# Patient Record
Sex: FEMALE | Race: BLACK OR AFRICAN AMERICAN | NOT HISPANIC OR LATINO | URBAN - METROPOLITAN AREA
[De-identification: names, ages, dates, MRNs, and addresses within clinical notes are randomized per-mention and may not be internally consistent; named-entity substitution may affect disease eponyms.]

---
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
## 2022-11-09 ENCOUNTER — HOSPITAL ENCOUNTER (EMERGENCY)
Facility: HOSPITAL | Age: 22
Discharge: HOME | End: 2022-11-09
Attending: FAMILY MEDICINE
Payer: MEDICAID

## 2022-11-09 VITALS
BODY MASS INDEX: 43.4 KG/M2 | RESPIRATION RATE: 18 BRPM | DIASTOLIC BLOOD PRESSURE: 81 MMHG | HEIGHT: 69 IN | OXYGEN SATURATION: 98 % | WEIGHT: 293 LBS | HEART RATE: 90 BPM | TEMPERATURE: 98 F | SYSTOLIC BLOOD PRESSURE: 138 MMHG

## 2022-11-09 DIAGNOSIS — R68.89 FLU-LIKE SYMPTOMS: Primary | ICD-10-CM

## 2022-11-09 LAB
FLUAV AG UPPER RESP QL IA.RAPID: DETECTED
FLUBV AG UPPER RESP QL IA.RAPID: NOT DETECTED
SARS-COV-2 RNA RESP QL NAA+PROBE: NOT DETECTED
STREP A PCR (OHS): NOT DETECTED

## 2022-11-09 PROCEDURE — 99284 EMERGENCY DEPT VISIT MOD MDM: CPT | Mod: 25

## 2022-11-09 PROCEDURE — 0241U COVID/FLU A&B PCR: CPT | Performed by: FAMILY MEDICINE

## 2022-11-09 PROCEDURE — 87651 STREP A DNA AMP PROBE: CPT | Performed by: FAMILY MEDICINE

## 2022-11-09 RX ORDER — IBUPROFEN 800 MG/1
800 TABLET ORAL EVERY 6 HOURS PRN
Qty: 20 TABLET | Refills: 0 | Status: SHIPPED | OUTPATIENT
Start: 2022-11-09

## 2022-11-09 RX ORDER — OSELTAMIVIR PHOSPHATE 75 MG/1
75 CAPSULE ORAL 2 TIMES DAILY
Qty: 10 CAPSULE | Refills: 0 | Status: SHIPPED | OUTPATIENT
Start: 2022-11-09 | End: 2022-11-14

## 2022-11-09 RX ORDER — BENZONATATE 200 MG/1
200 CAPSULE ORAL 3 TIMES DAILY PRN
Qty: 15 CAPSULE | Refills: 0 | Status: SHIPPED | OUTPATIENT
Start: 2022-11-09 | End: 2022-11-19

## 2022-11-09 NOTE — ED PROVIDER NOTES
Encounter Date: 11/9/2022       History     Chief Complaint   Patient presents with    Sore Throat     Sore throat, body aches, and cough x2 days; also reports fever at home      22-year-old presents with flu-like symptoms body aches sore throat headache since last night had fever earlier none now chills have resolved abdominal pain no nausea vomiting diarrhea      Review of patient's allergies indicates:  No Known Allergies  History reviewed. No pertinent past medical history.  History reviewed. No pertinent surgical history.  History reviewed. No pertinent family history.     Review of Systems   Constitutional:  Positive for appetite change and fatigue. Negative for fever.   HENT:  Negative for sore throat.    Respiratory:  Negative for shortness of breath.    Cardiovascular:  Negative for chest pain.   Gastrointestinal:  Negative for nausea.   Genitourinary:  Negative for dysuria.   Musculoskeletal:  Negative for back pain.   Skin:  Negative for rash.   Neurological:  Negative for weakness.   Hematological:  Does not bruise/bleed easily.   All other systems reviewed and are negative.    Physical Exam     Initial Vitals [11/09/22 1500]   BP Pulse Resp Temp SpO2   138/81 90 18 98 °F (36.7 °C) 98 %      MAP       --         Physical Exam    Nursing note and vitals reviewed.  Constitutional: She appears well-developed and well-nourished. She is active.   HENT:   Head: Normocephalic and atraumatic.   Eyes: Conjunctivae, EOM and lids are normal. Pupils are equal, round, and reactive to light.   Neck: Trachea normal and phonation normal. Neck supple. No thyroid mass present.   Normal range of motion.  Cardiovascular:  Normal rate, regular rhythm, normal heart sounds and normal pulses.           Pulmonary/Chest: Breath sounds normal.   Abdominal: Abdomen is soft.   Musculoskeletal:         General: Normal range of motion.      Cervical back: Normal range of motion and neck supple.     Neurological: She is alert and  oriented to person, place, and time.   Skin: Skin is warm and intact.   Psychiatric: She has a normal mood and affect. Her speech is normal and behavior is normal. Judgment and thought content normal. Cognition and memory are normal.       ED Course   Procedures  Labs Reviewed   COVID/FLU A&B PCR - Abnormal; Notable for the following components:       Result Value    Influenza A PCR Detected (*)     All other components within normal limits    Narrative:     The Xpert Xpress SARS-CoV-2/FLU/RSV plus is a rapid, multiplexed real-time PCR test intended for the simultaneous qualitative detection and differentiation of SARS-CoV-2, Influenza A, Influenza B, and respiratory syncytial virus (RSV) viral RNA in either nasopharyngeal swab or nasal swab specimens.         STREP GROUP A BY PCR - Normal    Narrative:     The Xpert Xpress Strep A test is a rapid, qualitative in vitro diagnostic test for the detection of Streptococcus pyogenes (Group A ß-hemolytic Streptococcus, Strep A) in throat swab specimens from patients with signs and symptoms of pharyngitis.            Imaging Results    None          Medications - No data to display              ED Course as of 11/09/22 1615   Wed Nov 09, 2022   1611 STREP A PCR ([facility]): Not Detected [BL]      ED Course User Index  [BL] Austin Parish MD                 Clinical Impression:   Final diagnoses:  [R68.89] Flu-like symptoms (Primary)      ED Disposition Condition    Discharge Stable          ED Prescriptions       Medication Sig Dispense Start Date End Date Auth. Provider    oseltamivir (TAMIFLU) 75 MG capsule Take 1 capsule (75 mg total) by mouth 2 (two) times daily. for 5 days 10 capsule 11/9/2022 11/14/2022 Austin Parish MD    ibuprofen (ADVIL,MOTRIN) 800 MG tablet Take 1 tablet (800 mg total) by mouth every 6 (six) hours as needed for Pain. 20 tablet 11/9/2022 -- Austin Parish MD    benzonatate (TESSALON) 200 MG capsule Take 1 capsule (200 mg total) by mouth  3 (three) times daily as needed for Cough. 15 capsule 11/9/2022 11/19/2022 Austin Parish MD          Follow-up Information       Follow up With Specialties Details Why Contact Info    [facility] - Emergency Dept Emergency Medicine  If symptoms worsen [address] [id number] [phone]             Austin Parish MD  11/09/22 2670

## 2023-07-25 ENCOUNTER — HOSPITAL ENCOUNTER (EMERGENCY)
Facility: HOSPITAL | Age: 23
Discharge: HOME | End: 2023-07-25
Attending: SPECIALIST
Payer: MEDICAID

## 2023-07-25 VITALS
SYSTOLIC BLOOD PRESSURE: 105 MMHG | HEIGHT: 69 IN | TEMPERATURE: 98 F | RESPIRATION RATE: 18 BRPM | WEIGHT: 293 LBS | DIASTOLIC BLOOD PRESSURE: 72 MMHG | HEART RATE: 82 BPM | BODY MASS INDEX: 43.4 KG/M2

## 2023-07-25 DIAGNOSIS — R10.31 BILATERAL LOWER ABDOMINAL DISCOMFORT: ICD-10-CM

## 2023-07-25 DIAGNOSIS — M54.50 LOW BACK PAIN WITHOUT SCIATICA, UNSPECIFIED BACK PAIN LATERALITY, UNSPECIFIED CHRONICITY: Primary | ICD-10-CM

## 2023-07-25 DIAGNOSIS — R10.32 BILATERAL LOWER ABDOMINAL DISCOMFORT: ICD-10-CM

## 2023-07-25 LAB
ALBUMIN SERPL-MCNC: 4.1 G/DL (ref 3.5–5)
ALBUMIN/GLOB SERPL: 1.1 RATIO (ref 1.1–2)
ALP SERPL-CCNC: 90 UNIT/L (ref 40–150)
ALT SERPL-CCNC: 21 UNIT/L (ref 0–55)
APPEARANCE UR: CLEAR
AST SERPL-CCNC: 21 UNIT/L (ref 5–34)
B-HCG SERPL QL: NEGATIVE
BACTERIA #/AREA URNS AUTO: NORMAL /HPF
BASOPHILS # BLD AUTO: 0.01 X10(3)/MCL
BASOPHILS NFR BLD AUTO: 0.1 %
BILIRUB UR QL STRIP.AUTO: NEGATIVE
BILIRUBIN DIRECT+TOT PNL SERPL-MCNC: 0.6 MG/DL
BUN SERPL-MCNC: 15.8 MG/DL (ref 7–18.7)
CALCIUM SERPL-MCNC: 9.5 MG/DL (ref 8.4–10.2)
CHLORIDE SERPL-SCNC: 104 MMOL/L (ref 98–107)
CO2 SERPL-SCNC: 28 MMOL/L (ref 22–29)
COLOR UR: YELLOW
CREAT SERPL-MCNC: 0.83 MG/DL (ref 0.55–1.02)
EOSINOPHIL # BLD AUTO: 0.13 X10(3)/MCL (ref 0–0.9)
EOSINOPHIL NFR BLD AUTO: 1.6 %
ERYTHROCYTE [DISTWIDTH] IN BLOOD BY AUTOMATED COUNT: 15.3 % (ref 11.5–17)
GFR SERPLBLD CREATININE-BSD FMLA CKD-EPI: >60 MLS/MIN/1.73/M2
GLOBULIN SER-MCNC: 3.9 GM/DL (ref 2.4–3.5)
GLUCOSE SERPL-MCNC: 99 MG/DL (ref 74–100)
GLUCOSE UR QL STRIP.AUTO: NEGATIVE
HCT VFR BLD AUTO: 40.2 % (ref 37–47)
HGB BLD-MCNC: 12 G/DL (ref 12–16)
IMM GRANULOCYTES # BLD AUTO: 0.01 X10(3)/MCL (ref 0–0.04)
IMM GRANULOCYTES NFR BLD AUTO: 0.1 %
KETONES UR QL STRIP.AUTO: ABNORMAL
LEUKOCYTE ESTERASE UR QL STRIP.AUTO: NEGATIVE
LIPASE SERPL-CCNC: 46 U/L
LYMPHOCYTES # BLD AUTO: 1.8 X10(3)/MCL (ref 0.6–4.6)
LYMPHOCYTES NFR BLD AUTO: 22.4 %
MCH RBC QN AUTO: 21.5 PG (ref 27–31)
MCHC RBC AUTO-ENTMCNC: 29.9 G/DL (ref 33–36)
MCV RBC AUTO: 72.2 FL (ref 80–94)
MONOCYTES # BLD AUTO: 0.57 X10(3)/MCL (ref 0.1–1.3)
MONOCYTES NFR BLD AUTO: 7.1 %
NEUTROPHILS # BLD AUTO: 5.51 X10(3)/MCL (ref 2.1–9.2)
NEUTROPHILS NFR BLD AUTO: 68.7 %
NITRITE UR QL STRIP.AUTO: NEGATIVE
PH UR STRIP.AUTO: 5.5 [PH]
PLATELET # BLD AUTO: 320 X10(3)/MCL (ref 130–400)
PMV BLD AUTO: 9.7 FL (ref 7.4–10.4)
POTASSIUM SERPL-SCNC: 3.5 MMOL/L (ref 3.5–5.1)
PROT SERPL-MCNC: 8 GM/DL (ref 6.4–8.3)
PROT UR QL STRIP.AUTO: 30
RBC # BLD AUTO: 5.57 X10(6)/MCL (ref 4.2–5.4)
RBC #/AREA URNS AUTO: NORMAL /HPF
RBC UR QL AUTO: NEGATIVE
SODIUM SERPL-SCNC: 141 MMOL/L (ref 136–145)
SP GR UR STRIP.AUTO: >=1.03
SQUAMOUS #/AREA URNS AUTO: NORMAL /HPF
UROBILINOGEN UR STRIP-ACNC: 0.2
WBC # SPEC AUTO: 8.03 X10(3)/MCL (ref 4.5–11.5)
WBC #/AREA URNS AUTO: NORMAL /HPF

## 2023-07-25 PROCEDURE — 25000003 PHARM REV CODE 250: Performed by: SPECIALIST

## 2023-07-25 PROCEDURE — 80053 COMPREHEN METABOLIC PANEL: CPT | Performed by: SPECIALIST

## 2023-07-25 PROCEDURE — 85025 COMPLETE CBC W/AUTO DIFF WBC: CPT | Performed by: SPECIALIST

## 2023-07-25 PROCEDURE — 63600175 PHARM REV CODE 636 W HCPCS: Performed by: SPECIALIST

## 2023-07-25 PROCEDURE — 96372 THER/PROPH/DIAG INJ SC/IM: CPT | Performed by: SPECIALIST

## 2023-07-25 PROCEDURE — 99284 EMERGENCY DEPT VISIT MOD MDM: CPT

## 2023-07-25 PROCEDURE — 81001 URINALYSIS AUTO W/SCOPE: CPT | Performed by: SPECIALIST

## 2023-07-25 PROCEDURE — 83690 ASSAY OF LIPASE: CPT | Performed by: SPECIALIST

## 2023-07-25 PROCEDURE — 81025 URINE PREGNANCY TEST: CPT | Performed by: SPECIALIST

## 2023-07-25 RX ORDER — HYOSCYAMINE SULFATE 0.12 MG/1
0.12 TABLET SUBLINGUAL
Status: COMPLETED | OUTPATIENT
Start: 2023-07-25 | End: 2023-07-25

## 2023-07-25 RX ORDER — HYOSCYAMINE SULFATE 0.125 MG
125 TABLET ORAL EVERY 4 HOURS PRN
Qty: 20 TABLET | Refills: 0 | Status: SHIPPED | OUTPATIENT
Start: 2023-07-25 | End: 2023-08-24

## 2023-07-25 RX ORDER — KETOROLAC TROMETHAMINE 30 MG/ML
60 INJECTION, SOLUTION INTRAMUSCULAR; INTRAVENOUS
Status: COMPLETED | OUTPATIENT
Start: 2023-07-25 | End: 2023-07-25

## 2023-07-25 RX ORDER — MAG HYDROX/ALUMINUM HYD/SIMETH 200-200-20
5 SUSPENSION, ORAL (FINAL DOSE FORM) ORAL
Status: COMPLETED | OUTPATIENT
Start: 2023-07-25 | End: 2023-07-25

## 2023-07-25 RX ADMIN — HYOSCYAMINE SULFATE 0.12 MG: 0.12 TABLET SUBLINGUAL at 02:07

## 2023-07-25 RX ADMIN — ALUMINUM HYDROXIDE, MAGNESIUM HYDROXIDE, AND SIMETHICONE 5 ML: 200; 200; 20 SUSPENSION ORAL at 02:07

## 2023-07-25 RX ADMIN — KETOROLAC TROMETHAMINE 60 MG: 60 INJECTION, SOLUTION INTRAMUSCULAR at 03:07

## 2023-07-25 NOTE — ED PROVIDER NOTES
Encounter Date: 7/25/2023       History     Chief Complaint   Patient presents with    Back Pain     Lower back pain and abd pain that started 45min ago. Denies any other symptoms besides pain.      Patient with low back and midabdominal pain that began about 45 minutes prior to presentation; no nausea, no vomiting, no diarrhea, no fever, no dysuria; no history of abdominal complaints    The history is provided by the patient.   Review of patient's allergies indicates:  No Known Allergies  No past medical history on file.  No past surgical history on file.  No family history on file.     Review of Systems   Constitutional: Negative.    HENT: Negative.     Respiratory: Negative.     Cardiovascular: Negative.    Gastrointestinal: Negative.    Musculoskeletal: Negative.    Skin: Negative.    Neurological: Negative.    All other systems reviewed and are negative.    Physical Exam     Initial Vitals [07/25/23 0216]   BP Pulse Resp Temp SpO2   105/72 82 18 98.3 °F (36.8 °C) --      MAP       --         Physical Exam    Nursing note and vitals reviewed.  Constitutional: She appears well-developed and well-nourished.   HENT:   Head: Normocephalic and atraumatic.   Eyes: EOM are normal. Pupils are equal, round, and reactive to light.   Neck: Neck supple.   Normal range of motion.  Cardiovascular:  Normal rate, regular rhythm, normal heart sounds and intact distal pulses.           Pulmonary/Chest: Breath sounds normal.   Abdominal: Abdomen is soft. Bowel sounds are normal. She exhibits no distension. There is abdominal tenderness (mild-to-moderate, generalized).   Obese There is no rebound and no guarding.   Musculoskeletal:         General: Normal range of motion.      Cervical back: Normal range of motion and neck supple.     Neurological: She is alert and oriented to person, place, and time. She has normal strength.   Skin: Skin is warm and dry.       ED Course   Procedures  Labs Reviewed   URINALYSIS, REFLEX TO URINE  CULTURE - Abnormal; Notable for the following components:       Result Value    Protein, UA 30 (*)     Ketones, UA Trace (*)     All other components within normal limits   COMPREHENSIVE METABOLIC PANEL - Abnormal; Notable for the following components:    Globulin 3.9 (*)     All other components within normal limits   CBC WITH DIFFERENTIAL - Abnormal; Notable for the following components:    RBC 5.57 (*)     MCV 72.2 (*)     MCH 21.5 (*)     MCHC 29.9 (*)     All other components within normal limits   PREGNANCY TEST, URINE RAPID - Normal   URINALYSIS, MICROSCOPIC - Normal   LIPASE - Normal   CBC W/ AUTO DIFFERENTIAL    Narrative:     The following orders were created for panel order CBC auto differential.  Procedure                               Abnormality         Status                     ---------                               -----------         ------                     CBC with Differential[[id number]]        Abnormal            Final result                 Please view results for these tests on the individual orders.          Imaging Results    None          Medications   hyoscyamine ODT 0.125 mg (0.125 mg Oral Given 7/25/23 0255)   aluminum-magnesium hydroxide-simethicone 200-200-20 mg/5 mL suspension 5 mL (5 mLs Oral Given 7/25/23 0255)   ketorolac injection 60 mg (60 mg Intramuscular Given 7/25/23 0343)     Medical Decision Making:   Initial Assessment:   Patient with low back and midabdominal pain that began about 45 minutes prior to presentation; no nausea, no vomiting, no diarrhea, no fever, no dysuria; no history of abdominal complaints  Differential Diagnosis:   Viral, cholecystitis, hepatitis, pancreatitis, UTI  Clinical Tests:   Lab Tests: Ordered and Reviewed  The following lab test(s) were unremarkable: CBC, CMP, Urinalysis and UPT  ED Management:  GI cocktail given; labs drawn; patient states the GI cocktail did not help and Toradol 60 mg IM was given; patient has good relief of her pain; her  "labs reviewed and discussed with the patient which appeared unremarkable; encouraged follow up with her primary care physician               Patient Vitals for the past 24 hrs:   BP Temp Pulse Resp Height Weight   07/25/23 0216 105/72 98.3 °F (36.8 °C) 82 18 5' 9" (1.753 m) (!) 165.1 kg (364 lb)     The patient is resting comfortably and in no acute distress.  She states that her symptoms have improved after treatment in Emergency Department. I personally discussed her test results and treatment plan.  Gave strict ED precautions.  Specific conditions for return to the emergency department and importance of follow up with her primary care provided or the physician listed on the discharge instructions.  Patient voices understanding and agrees to the plan discussed. All patients' questions have been answered at this time.   She has remained hemodynamically stable throughout entire stay in ED and is stable for discharge home.          Clinical Impression:   Final diagnoses:  [M54.50] Low back pain without sciatica, unspecified back pain laterality, unspecified chronicity (Primary)  [R10.31, R10.32] Bilateral lower abdominal discomfort        ED Disposition Condition    Discharge Stable          ED Prescriptions       Medication Sig Dispense Start Date End Date Auth. Provider    hyoscyamine (ANASPAZ,LEVSIN) 0.125 mg Tab Take 1 tablet (125 mcg total) by mouth every 4 (four) hours as needed. 20 tablet 7/25/2023 8/24/2023 Bk Rod MD          Follow-up Information       Follow up With Specialties Details Why Contact Info    Primary care physician  In 3 days As needed              Bk Rod MD  07/25/23 0417    "

## 2024-02-05 ENCOUNTER — HOSPITAL ENCOUNTER (EMERGENCY)
Facility: HOSPITAL | Age: 24
Discharge: HOME | End: 2024-02-05
Attending: FAMILY MEDICINE
Payer: MEDICAID

## 2024-02-05 VITALS
HEART RATE: 73 BPM | BODY MASS INDEX: 43.4 KG/M2 | RESPIRATION RATE: 16 BRPM | DIASTOLIC BLOOD PRESSURE: 62 MMHG | SYSTOLIC BLOOD PRESSURE: 122 MMHG | WEIGHT: 293 LBS | TEMPERATURE: 98 F | OXYGEN SATURATION: 100 % | HEIGHT: 69 IN

## 2024-02-05 DIAGNOSIS — R07.9 CHEST PAIN: ICD-10-CM

## 2024-02-05 DIAGNOSIS — R00.2 PALPITATIONS: ICD-10-CM

## 2024-02-05 DIAGNOSIS — R55 SYNCOPE, UNSPECIFIED SYNCOPE TYPE: Primary | ICD-10-CM

## 2024-02-05 LAB
ALBUMIN SERPL-MCNC: 3.7 G/DL (ref 3.5–5)
ALBUMIN/GLOB SERPL: 1 RATIO (ref 1.1–2)
ALP SERPL-CCNC: 85 UNIT/L (ref 40–150)
ALT SERPL-CCNC: 18 UNIT/L (ref 0–55)
AMPHET UR QL SCN: NEGATIVE
APPEARANCE UR: ABNORMAL
AST SERPL-CCNC: 17 UNIT/L (ref 5–34)
B-HCG SERPL QL: NEGATIVE
BACTERIA #/AREA URNS AUTO: ABNORMAL /HPF
BARBITURATE SCN PRESENT UR: NEGATIVE
BASOPHILS # BLD AUTO: 0.01 X10(3)/MCL
BASOPHILS NFR BLD AUTO: 0.2 %
BENZODIAZ UR QL SCN: NEGATIVE
BILIRUB SERPL-MCNC: 0.6 MG/DL
BILIRUB UR QL STRIP.AUTO: NEGATIVE
BUN SERPL-MCNC: 11.8 MG/DL (ref 7–18.7)
CALCIUM SERPL-MCNC: 9 MG/DL (ref 8.4–10.2)
CANNABINOIDS UR QL SCN: POSITIVE
CHLORIDE SERPL-SCNC: 108 MMOL/L (ref 98–107)
CO2 SERPL-SCNC: 24 MMOL/L (ref 22–29)
COCAINE UR QL SCN: NEGATIVE
COLOR UR AUTO: YELLOW
CREAT SERPL-MCNC: 0.92 MG/DL (ref 0.55–1.02)
EOSINOPHIL # BLD AUTO: 0.16 X10(3)/MCL (ref 0–0.9)
EOSINOPHIL NFR BLD AUTO: 2.6 %
ERYTHROCYTE [DISTWIDTH] IN BLOOD BY AUTOMATED COUNT: 15.4 % (ref 11.5–17)
ETHANOL SERPL-MCNC: <10 MG/DL
FLUAV AG UPPER RESP QL IA.RAPID: NOT DETECTED
FLUBV AG UPPER RESP QL IA.RAPID: NOT DETECTED
GFR SERPLBLD CREATININE-BSD FMLA CKD-EPI: >60 MLS/MIN/1.73/M2
GLOBULIN SER-MCNC: 3.8 GM/DL (ref 2.4–3.5)
GLUCOSE SERPL-MCNC: 98 MG/DL (ref 74–100)
GLUCOSE UR QL STRIP.AUTO: NEGATIVE
HCT VFR BLD AUTO: 38 % (ref 37–47)
HGB BLD-MCNC: 11.4 G/DL (ref 12–16)
IMM GRANULOCYTES # BLD AUTO: 0.01 X10(3)/MCL (ref 0–0.04)
IMM GRANULOCYTES NFR BLD AUTO: 0.2 %
KETONES UR QL STRIP.AUTO: NEGATIVE
LEUKOCYTE ESTERASE UR QL STRIP.AUTO: ABNORMAL
LYMPHOCYTES # BLD AUTO: 2.97 X10(3)/MCL (ref 0.6–4.6)
LYMPHOCYTES NFR BLD AUTO: 47.6 %
MCH RBC QN AUTO: 21.3 PG (ref 27–31)
MCHC RBC AUTO-ENTMCNC: 30 G/DL (ref 33–36)
MCV RBC AUTO: 70.9 FL (ref 80–94)
MONOCYTES # BLD AUTO: 0.49 X10(3)/MCL (ref 0.1–1.3)
MONOCYTES NFR BLD AUTO: 7.9 %
NEUTROPHILS # BLD AUTO: 2.6 X10(3)/MCL (ref 2.1–9.2)
NEUTROPHILS NFR BLD AUTO: 41.5 %
NITRITE UR QL STRIP.AUTO: POSITIVE
OPIATES UR QL SCN: NEGATIVE
PCP UR QL: NEGATIVE
PH UR STRIP.AUTO: 8.5 [PH]
PH UR: 8.5 [PH] (ref 3–11)
PLATELET # BLD AUTO: 323 X10(3)/MCL (ref 130–400)
PMV BLD AUTO: 10 FL (ref 7.4–10.4)
POC CARDIAC TROPONIN I: 0 NG/ML (ref 0–0.08)
POCT GLUCOSE: 91 MG/DL (ref 70–110)
POTASSIUM SERPL-SCNC: 3.6 MMOL/L (ref 3.5–5.1)
PROT SERPL-MCNC: 7.5 GM/DL (ref 6.4–8.3)
PROT UR QL STRIP.AUTO: 30
RBC # BLD AUTO: 5.36 X10(6)/MCL (ref 4.2–5.4)
RBC #/AREA URNS AUTO: ABNORMAL /HPF
RBC UR QL AUTO: NEGATIVE
RSV A 5' UTR RNA NPH QL NAA+PROBE: NOT DETECTED
SAMPLE: NORMAL
SARS-COV-2 RNA RESP QL NAA+PROBE: NOT DETECTED
SODIUM SERPL-SCNC: 140 MMOL/L (ref 136–145)
SP GR UR STRIP.AUTO: 1.02 (ref 1–1.03)
SPECIFIC GRAVITY, URINE AUTO (.000) (OHS): 1.02 (ref 1–1.03)
SQUAMOUS #/AREA URNS AUTO: ABNORMAL /HPF
UROBILINOGEN UR STRIP-ACNC: 1
WBC # SPEC AUTO: 6.24 X10(3)/MCL (ref 4.5–11.5)
WBC #/AREA URNS AUTO: ABNORMAL /HPF

## 2024-02-05 PROCEDURE — 93005 ELECTROCARDIOGRAM TRACING: CPT

## 2024-02-05 PROCEDURE — 81003 URINALYSIS AUTO W/O SCOPE: CPT | Performed by: FAMILY MEDICINE

## 2024-02-05 PROCEDURE — 87086 URINE CULTURE/COLONY COUNT: CPT | Performed by: FAMILY MEDICINE

## 2024-02-05 PROCEDURE — 96361 HYDRATE IV INFUSION ADD-ON: CPT

## 2024-02-05 PROCEDURE — 80307 DRUG TEST PRSMV CHEM ANLYZR: CPT | Performed by: FAMILY MEDICINE

## 2024-02-05 PROCEDURE — 99285 EMERGENCY DEPT VISIT HI MDM: CPT | Mod: 25

## 2024-02-05 PROCEDURE — 25000003 PHARM REV CODE 250: Performed by: FAMILY MEDICINE

## 2024-02-05 PROCEDURE — 93010 ELECTROCARDIOGRAM REPORT: CPT | Mod: ,,, | Performed by: INTERNAL MEDICINE

## 2024-02-05 PROCEDURE — 0241U COVID/RSV/FLU A&B PCR: CPT | Performed by: FAMILY MEDICINE

## 2024-02-05 PROCEDURE — 80053 COMPREHEN METABOLIC PANEL: CPT | Performed by: FAMILY MEDICINE

## 2024-02-05 PROCEDURE — 63600175 PHARM REV CODE 636 W HCPCS: Performed by: FAMILY MEDICINE

## 2024-02-05 PROCEDURE — 82077 ASSAY SPEC XCP UR&BREATH IA: CPT | Performed by: FAMILY MEDICINE

## 2024-02-05 PROCEDURE — 96374 THER/PROPH/DIAG INJ IV PUSH: CPT

## 2024-02-05 PROCEDURE — 85025 COMPLETE CBC W/AUTO DIFF WBC: CPT | Performed by: FAMILY MEDICINE

## 2024-02-05 PROCEDURE — 81025 URINE PREGNANCY TEST: CPT | Performed by: FAMILY MEDICINE

## 2024-02-05 PROCEDURE — 82962 GLUCOSE BLOOD TEST: CPT

## 2024-02-05 RX ORDER — ONDANSETRON 4 MG/1
4 TABLET, FILM COATED ORAL EVERY 6 HOURS
Qty: 12 TABLET | Refills: 0 | Status: SHIPPED | OUTPATIENT
Start: 2024-02-05

## 2024-02-05 RX ORDER — ONDANSETRON HYDROCHLORIDE 2 MG/ML
8 INJECTION, SOLUTION INTRAVENOUS
Status: COMPLETED | OUTPATIENT
Start: 2024-02-05 | End: 2024-02-05

## 2024-02-05 RX ORDER — NITROFURANTOIN 25; 75 MG/1; MG/1
100 CAPSULE ORAL 2 TIMES DAILY
Qty: 14 CAPSULE | Refills: 0 | Status: SHIPPED | OUTPATIENT
Start: 2024-02-05 | End: 2024-02-12

## 2024-02-05 RX ADMIN — SODIUM CHLORIDE 1000 ML: 9 INJECTION, SOLUTION INTRAVENOUS at 03:02

## 2024-02-05 RX ADMIN — ONDANSETRON 8 MG: 2 INJECTION INTRAMUSCULAR; INTRAVENOUS at 03:02

## 2024-02-05 NOTE — ED NOTES
Pt. Is being cared for in [facility] at this time - no room available. Receiving IV fluids.

## 2024-02-05 NOTE — ED PROVIDER NOTES
Encounter Date: 2/5/2024       History     Chief Complaint   Patient presents with    Palpitations     Pt states she feels like she is blacking out.  She states she feels like her heart is racing.  She states she passed out a week ago.  She states she passed out a week ago. She did say she smoke weed about 3 hrs ago.     Loss of Consciousness     Syncopal episode  22-year-old who smoked marijuana at home stated that she was blacking out and feels like her heart is racing patient otherwise has passed out last week with no new episodes no chronic history contributing to patient's episode patient is the history sores        Review of patient's allergies indicates:  No Known Allergies  No past medical history on file.  No past surgical history on file.  No family history on file.     Review of Systems   Constitutional:  Negative for fever.   HENT:  Negative for sore throat.    Respiratory:  Negative for shortness of breath.    Cardiovascular:  Negative for chest pain.   Gastrointestinal:  Negative for nausea.   Genitourinary:  Negative for dysuria.   Musculoskeletal:  Negative for back pain.   Skin:  Negative for rash.   Neurological:  Positive for syncope. Negative for weakness.   Hematological:  Does not bruise/bleed easily.   All other systems reviewed and are negative.      Physical Exam     Initial Vitals [02/05/24 1526]   BP Pulse Resp Temp SpO2   115/72 (!) 54 18 97.9 °F (36.6 °C) 98 %      MAP       --         Physical Exam    Nursing note and vitals reviewed.  Constitutional: She appears well-developed.   HENT:   Head: Normocephalic and atraumatic.   Right Ear: External ear normal.   Left Ear: External ear normal.   Nose: Nose normal.   Mouth/Throat: Oropharynx is clear and moist. No oropharyngeal exudate.   Eyes: Conjunctivae and EOM are normal. Pupils are equal, round, and reactive to light. Right eye exhibits no discharge. Left eye exhibits no discharge.   Neck: Neck supple. No tracheal deviation present. No  JVD present.   Normal range of motion.  Cardiovascular:  Normal rate, regular rhythm, normal heart sounds and intact distal pulses.     Exam reveals no gallop and no friction rub.       No murmur heard.  Pulmonary/Chest: Breath sounds normal. No stridor. No respiratory distress. She has no wheezes. She has no rhonchi. She has no rales.   Abdominal: Abdomen is soft. Bowel sounds are normal. She exhibits no distension and no mass. There is no abdominal tenderness. There is no rebound and no guarding.   Musculoskeletal:         General: Normal range of motion.      Cervical back: Normal range of motion and neck supple.     Neurological: She is alert and oriented to person, place, and time. She has normal strength. No cranial nerve deficit.   Skin: Skin is warm and dry. No rash and no abscess noted. No erythema.   Psychiatric: She has a normal mood and affect. Her behavior is normal. Judgment and thought content normal.         ED Course   Procedures  Labs Reviewed   COMPREHENSIVE METABOLIC PANEL - Abnormal; Notable for the following components:       Result Value    Chloride 108 (*)     Globulin 3.8 (*)     Albumin/Globulin Ratio 1.0 (*)     All other components within normal limits   URINALYSIS, REFLEX TO URINE CULTURE - Abnormal; Notable for the following components:    Appearance, UA Slightly Cloudy (*)     Protein, UA 30 (*)     Nitrites, UA Positive (*)     Leukocyte Esterase, UA Moderate (*)     All other components within normal limits   DRUG SCREEN, URINE (BEAKER) - Abnormal; Notable for the following components:    Cannabinoids, Urine Positive (*)     All other components within normal limits    Narrative:     Cut off concentrations:    Amphetamines - 1000 ng/ml  Barbiturates - 200 ng/ml  Benzodiazepine - 200 ng/ml  Cannabinoids (THC) - 50 ng/ml  Cocaine - 300 ng/ml  Fentanyl - 1.0 ng/ml  MDMA - 500 ng/ml  Opiates - 300 ng/ml   Phencyclidine (PCP) - 25 ng/ml    Specimen submitted for drug analysis and tested  for pH and specific gravity in order to evaluate sample integrity. Suspect tampering if specific gravity is <1.003 and/or pH is not within the range of 4.5 - 8.0  False negatives may result form substances such as bleach added to urine.  False positives may result for the presence of a substance with similar chemical structure to the drug or its metabolite.    This test provides only a PRELIMINARY analytical test result. A more specific alternate chemical method must be used in order to obtain a confirmed analytical result. Gas chromatography/mass spectrometry (GC/MS) is the preferred confirmatory method. Other chemical confirmation methods are available. Clinical consideration and professional judgement should be applied to any drug of abuse test result, particularly when preliminary positive results are used.    Positive results will be confirmed only at the physicians request. Unconfirmed screening results are to be used only for medical purposes (treatment).        CBC WITH DIFFERENTIAL - Abnormal; Notable for the following components:    Hgb 11.4 (*)     MCV 70.9 (*)     MCH 21.3 (*)     MCHC 30.0 (*)     All other components within normal limits   URINALYSIS, MICROSCOPIC - Abnormal; Notable for the following components:    Bacteria, UA Moderate (*)     WBC, UA 11-20 (*)     Squamous Epithelial Cells, UA Few (*)     All other components within normal limits   COVID/RSV/FLU A&B PCR - Normal    Narrative:     The Xpert Xpress SARS-CoV-2/FLU/RSV plus is a rapid, multiplexed real-time PCR test intended for the simultaneous qualitative detection and differentiation of SARS-CoV-2, Influenza A, Influenza B, and respiratory syncytial virus (RSV) viral RNA in either nasopharyngeal swab or nasal swab specimens.         PREGNANCY TEST, URINE RAPID - Normal   ALCOHOL,MEDICAL (ETHANOL) - Normal   CULTURE, URINE   CBC W/ AUTO DIFFERENTIAL    Narrative:     The following orders were created for panel order CBC auto  differential.  Procedure                               Abnormality         Status                     ---------                               -----------         ------                     CBC with Differential[[id number]]        Abnormal            Final result                 Please view results for these tests on the individual orders.   TROPONIN ISTAT   POCT GLUCOSE   POCT TROPONIN          Imaging Results              X-Ray Chest AP Portable (Final result)  Result time 02/05/24 17:21:02      Final result by Jerad Moon MD (02/05/24 17:21:02)                   Impression:      No abnormality seen      Electronically signed by: Robert Moon  Date:    02/05/2024  Time:    17:21               Narrative:    EXAMINATION:  XR CHEST AP PORTABLE    CLINICAL HISTORY:  Chest Pain;    TECHNIQUE:  Single frontal view of the chest was performed.    COMPARISON:  01/24/2020    FINDINGS:  The lungs are clear.  The heart is normal appearance.  The pulmonary vascularity is unremarkable.  Aorta appears grossly unremarkable.  No pleural effusions are seen.  Bones and joints show no acute abnormality.                                       Medications   sodium chloride 0.9% bolus 1,000 mL 1,000 mL (1,000 mLs Intravenous New Bag 2/5/24 1552)   ondansetron injection 8 mg (8 mg Intravenous Given 2/5/24 1552)     Medical Decision Making  COVID flu pneumonia bronchitis sinusitis    Amount and/or Complexity of Data Reviewed  Labs: ordered.  Radiology: ordered.    Risk  Prescription drug management.                                      Clinical Impression:  Final diagnoses:  [R00.2] Palpitations  [R07.9] Chest pain  [R55] Syncope, unspecified syncope type (Primary)          ED Disposition Condition    Discharge Stable          ED Prescriptions       Medication Sig Dispense Start Date End Date Auth. Provider    ondansetron (ZOFRAN) 4 MG tablet Take 1 tablet (4 mg total) by mouth every 6 (six) hours. 12 tablet 2/5/2024 -- Ramana  Rogelio SALAS MD          Follow-up Information    None          Rogelio Boles MD  02/05/24 6189

## 2024-02-05 NOTE — ED NOTES
Pt moved into [facility] in w/c. She states she feels hot, sweaty and sob/ informed pt that her vs are all good. She states she passed out last week and did not smoke weed then/ fan placed to blow on her/ spoke to dr leary. He ordered labs, xray, ivf.

## 2024-02-05 NOTE — ED NOTES
MD notified and viewed all lab results- pt will be d/c with zofran and macrobid sent to pharmacy.

## 2024-02-08 LAB
BACTERIA UR CULT: ABNORMAL
OHS QRS DURATION: 98 MS
OHS QTC CALCULATION: 419 MS

## 2024-04-15 ENCOUNTER — HOSPITAL ENCOUNTER (EMERGENCY)
Facility: HOSPITAL | Age: 24
Discharge: HOME | End: 2024-04-15
Attending: STUDENT IN AN ORGANIZED HEALTH CARE EDUCATION/TRAINING PROGRAM
Payer: MEDICAID

## 2024-04-15 VITALS
TEMPERATURE: 99 F | OXYGEN SATURATION: 98 % | HEART RATE: 111 BPM | DIASTOLIC BLOOD PRESSURE: 73 MMHG | RESPIRATION RATE: 18 BRPM | SYSTOLIC BLOOD PRESSURE: 120 MMHG

## 2024-04-15 DIAGNOSIS — J35.8 TONSIL STONE: Primary | ICD-10-CM

## 2024-04-15 LAB
FLUAV AG UPPER RESP QL IA.RAPID: NOT DETECTED
FLUBV AG UPPER RESP QL IA.RAPID: NOT DETECTED
SARS-COV-2 RNA RESP QL NAA+PROBE: NOT DETECTED
STREP A PCR (OHS): NOT DETECTED

## 2024-04-15 PROCEDURE — 87651 STREP A DNA AMP PROBE: CPT | Performed by: STUDENT IN AN ORGANIZED HEALTH CARE EDUCATION/TRAINING PROGRAM

## 2024-04-15 PROCEDURE — 0240U COVID/FLU A&B PCR: CPT | Performed by: STUDENT IN AN ORGANIZED HEALTH CARE EDUCATION/TRAINING PROGRAM

## 2024-04-15 PROCEDURE — 99282 EMERGENCY DEPT VISIT SF MDM: CPT

## 2024-04-16 NOTE — DISCHARGE INSTRUCTIONS
Tonsil Stones  Tonsil stones are small, pebble-like lumps that form in your tonsils. Theyre made of hardened minerals (like calcium), food debris and germs. The main symptom is bad breath, but some people develop other issues like a cough or sore throat. Tonsil stones usually arent harmful, but a provider can remove them if theyre uncomfortable.  ContentsOverviewSymptoms and CausesDiagnosis and TestsManagement and TreatmentPreventionLiving WithAdditional Common Questions  Overview  Tonsil stones are small lumps of calcified material that form in your tonsils nooks and crannies.  What are tonsil stones?  Tonsil stones are small lumps of calcified (hardened) material that form in your tonsils nooks and crannies. They consist of hardened minerals (like calcium), food debris and bacteria or fungi. Theyre rarely harmful, but they can cause bad breath, sore throat, earache and other symptoms. The medical term for tonsil stones is tonsilloliths.  Tonsil stones look like tiny white or yellow rowan on your tonsils. You might have one, or you might have several. Theyre usually small, but its possible to get large ones.  You can usually remove tonsil stones at home. Rarely, you might need surgery -- especially if they keep coming back.    ADVERTISEMENT  [facility] is a non-profit academic medical center. Advertising on our site helps support our mission. We do not endorse non-[facility] products or services. Policy  Symptoms and Causes  Symptoms of tonsil stones  The most common tonsil stone symptom is bad breath (halitosis). Other symptoms could include:  Bad taste in your mouth.  Sore throat.  Cough.  Earache.  Difficulty swallowing.  Feeling like theres something stuck in your throat.  Tonsil stone causes  Tonsil stones form when debris becomes trapped in folds called tonsillar crypts, and then calcifies. Youre more likely to develop enlarged tonsillar crypts if you get frequent tonsil  infections.  Risk factors  Anyone can get tonsil stones, but youre more likely to get them if you:  Get tonsillitis (frequent tonsil infections).  Have enlarged tonsillar crypts (folds).  Have dehydration.  Are a teenager.  ADVERTISEMENT  Complications  Tonsil stones are usually harmless. But large or chronic ones can cause swelling and make it difficult to swallow. In some cases, tonsil stones can trigger other types of infections.    Diagnosis and Tests  How doctors diagnose tonsil stones  To diagnose tonsil stones, your healthcare provider may:  Do a physical exam, looking inside your mouth and throat.  Take an imaging scan if they cant see the stones easily.  Dislodge the stones using a dental pick.  Sometimes, providers discover tonsil stones by coincidence. For instance, your dentist might find them during an exam.    ADVERTISEMENT  Management and Treatment  How are tonsil stones treated?  In many cases, you can remove tonsil stones at home. Here are some things you can try:  Gargle with warm saltwater.  Cough vigorously to try and dislodge the stones.  Use a water pick to flush tonsil stones out.  Use a cotton swab to gently push the tonsil stones out.  If youre unable to remove them at home, your provider can do tonsil stone removal during an office visit.  You may not need to do anything if tonsil stones dont bother you. In fact, tonsil stones often fall out on their own eventually and the underlying cause goes away. But if you get them frequently, or if they cause uncomfortable symptoms, your provider can recommend other treatments.  Medications  You can take over-the-counter (OTC) pain relievers to ease any discomfort. If you have an infection, your provider might prescribe antibiotics. But antibiotics arent a long-term solution. If tonsil stones cause symptoms or keep coming back, you might need to explore surgical options.  Surgery  Depending on your symptoms, your provider may recommend  tonsillectomy (surgery to remove your tonsils).    Care at [facility]  Find a Primary Care Provider  Schedule an Appointment  Prevention  Can tonsil stones be prevented?  You cant always prevent tonsil stones. But there are things you can do to reduce your risk:  Brush and floss regularly. Make sure to brush your tongue, too.  Quit smoking. Ask your healthcare provider for helpful resources.  Gargle with salt water after eating.  Stay hydrated.    Living With  When should I see my healthcare provider?  Talk to your provider if:  At-home remedies arent working as they should.  Tonsil stones keep coming back or are bothering you.  You want to discuss other treatment options.  What questions should I ask my healthcare provider?  If you have tonsil stones, you might want to ask your provider:  How can I get rid of tonsil stones at home?  What can I do to improve my symptoms?  What should I do if tonsil stones come back?  Will I need surgery?    Additional Common Questions  Is it normal to get tonsil stones?  Tonsil stones are common, and they rarely cause serious health issues. Many people have tonsil stones and dont know it. You can treat them at home most of the time. If tonsil stones keep coming back, you and your healthcare provider can discuss a more permanent solution.  Do tonsil stones go away on their own?  Yes, in many cases tonsil stones fall out on their own. You can help this process along by gargling with warm saltwater.  What happens if you swallow a tonsil stone?  Its OK to swallow tonsil stones. They wont hurt you. Tonsil stones commonly loosen and fall out on their own, so you may have already swallowed one or two without knowing it.  Are tonsil stones contagious?  No, tonsil stones arent contagious. You cant pass them on to other people.  Why do I keep getting tonsil stones?  If you get tonsil stones repeatedly, you probably have enlarged tonsillar crypts. Tonsillar crypts are folds that  form and grow larger after each tonsil infection. If you have tonsillitis frequently, then youre more likely to get tonsil stones.  A note from [facility]  Tonsil stones generally arent harmful. But they can become a major annoyance if you get them often. Its OK to try and remove them at home. But if home remedies dont work, its time to call your healthcare provider. They can recommend ways to prevent them and tell you whether you need surgery.

## 2024-04-16 NOTE — ED PROVIDER NOTES
Encounter Date: 4/15/2024       History     Chief Complaint   Patient presents with    Sore Throat     Throat pain x1 week. Denies any fever. States every now and then she coughs up a small amount of blood. White stones present on tonsils.     HPI  Patient is a 24-year-old female who presents to ER complaining of sore throat ongoing for the past 1 week.  She denies any fever, cough, known sick contacts or recent travel.  Review of patient's allergies indicates:  No Known Allergies  No past medical history on file.  No past surgical history on file.  No family history on file.     Review of Systems   Constitutional:  Negative for fever.   HENT:  Negative for sore throat.    Eyes:  Negative for visual disturbance.   Respiratory:  Negative for shortness of breath.    Cardiovascular:  Negative for chest pain.   Gastrointestinal:  Negative for nausea.   Endocrine: Negative for polyuria.   Genitourinary:  Negative for dysuria.   Musculoskeletal:  Negative for back pain.   Skin:  Negative for rash.   Neurological:  Negative for weakness.   Hematological:  Does not bruise/bleed easily.   All other systems reviewed and are negative.      Physical Exam     Initial Vitals [04/15/24 2144]   BP Pulse Resp Temp SpO2   120/73 (!) 111 18 98.5 °F (36.9 °C) 98 %      MAP       --         Physical Exam    Nursing note and vitals reviewed.  Constitutional: Vital signs are normal. She appears well-developed and well-nourished. She is not diaphoretic. She is active.  Non-toxic appearance. She does not appear ill. No distress.   HENT:   Head: Normocephalic and atraumatic.   Tonsillar stone noted to left tonsil.  Uvula midline, not swollen.  Tonsils nonerythematous.  Tolerating oral secretions.   Eyes: Conjunctivae are normal. Pupils are equal, round, and reactive to light. Right conjunctiva is not injected. Left conjunctiva is not injected.   Neck: Trachea normal. Neck supple.   Normal range of motion.   Full passive range of motion  without pain.     Cardiovascular:  Normal rate, regular rhythm, S1 normal, S2 normal, intact distal pulses and normal pulses.           Pulmonary/Chest: Breath sounds normal. No respiratory distress.   Abdominal: Abdomen is soft. Bowel sounds are normal. There is no abdominal tenderness.   Musculoskeletal:         General: No tenderness or edema. Normal range of motion.      Cervical back: Full passive range of motion without pain, normal range of motion and neck supple. No rigidity.      Right lower leg: No swelling. No edema.      Left lower leg: No swelling. No edema.     Neurological: She is alert and oriented to person, place, and time.   Skin: Skin is warm and dry. Capillary refill takes less than 2 seconds.         ED Course   Procedures  Labs Reviewed   STREP GROUP A BY PCR - Normal    Narrative:     The Xpert Xpress Strep A test is a rapid, qualitative in vitro diagnostic test for the detection of Streptococcus pyogenes (Group A ß-hemolytic Streptococcus, Strep A) in throat swab specimens from patients with signs and symptoms of pharyngitis.     COVID/FLU A&B PCR - Normal    Narrative:     The Xpert Xpress SARS-CoV-2/FLU/RSV plus is a rapid, multiplexed real-time PCR test intended for the simultaneous qualitative detection and differentiation of SARS-CoV-2, Influenza A, Influenza B, and respiratory syncytial virus (RSV) viral RNA in either nasopharyngeal swab or nasal swab specimens.                Imaging Results    None          Medications - No data to display  Medical Decision Making  Patient is a 24-year-old female who presents to ER complaining of sore throat ongoing for the past 1 week.     Differential diagnosis includes but not limited to COVID, influenza, strep pharyngitis, viral URI     Laboratories obtained to rule out influenza, COVID, strep.  All which were negative.  Patient on physical exam with tonsillar stone noted to left tonsil.  Patient counseled on symptomatic treatment.  She has been  encouraged to follow with the PCP in next 2-3 days.  Strict return precautions given.  Patient stable for discharge.    Bobby Weaver M.D.  Emergency Medicine        Amount and/or Complexity of Data Reviewed  Labs: ordered.                                      Clinical Impression:  Final diagnoses:  [J35.8] Tonsil stone (Primary)          ED Disposition Condition    Discharge Stable          ED Prescriptions    None       Follow-up Information       Follow up With Specialties Details Why Contact Info    Your primary care physician  Schedule an appointment as soon as possible for a visit in 2 days For follow up     [facility] - Emergency Dept Emergency Medicine  If symptoms worsen [address] [facility] [id number] [phone]             Bobby Weaver MD  04/16/24 0203

## 2024-05-12 ENCOUNTER — HOSPITAL ENCOUNTER (EMERGENCY)
Facility: HOSPITAL | Age: 24
Discharge: HOME | End: 2024-05-12
Attending: STUDENT IN AN ORGANIZED HEALTH CARE EDUCATION/TRAINING PROGRAM
Payer: MEDICAID

## 2024-05-12 VITALS
BODY MASS INDEX: 43.4 KG/M2 | SYSTOLIC BLOOD PRESSURE: 138 MMHG | OXYGEN SATURATION: 96 % | WEIGHT: 293 LBS | RESPIRATION RATE: 18 BRPM | DIASTOLIC BLOOD PRESSURE: 85 MMHG | TEMPERATURE: 98 F | HEIGHT: 69 IN | HEART RATE: 88 BPM

## 2024-05-12 DIAGNOSIS — N30.00 ACUTE CYSTITIS WITHOUT HEMATURIA: Primary | ICD-10-CM

## 2024-05-12 LAB
B-HCG UR QL: NEGATIVE
BACTERIA #/AREA URNS AUTO: ABNORMAL /HPF
BILIRUB UR QL STRIP.AUTO: NEGATIVE
CLARITY UR: CLEAR
COLOR UR AUTO: YELLOW
GLUCOSE UR QL STRIP: NEGATIVE
HGB UR QL STRIP: NEGATIVE
KETONES UR QL STRIP: NEGATIVE
LEUKOCYTE ESTERASE UR QL STRIP: ABNORMAL
NITRITE UR QL STRIP: NEGATIVE
PH UR STRIP: 7 [PH]
PROT UR QL STRIP: NEGATIVE
RBC #/AREA URNS AUTO: ABNORMAL /HPF
SP GR UR STRIP.AUTO: 1.02 (ref 1–1.03)
SQUAMOUS #/AREA URNS AUTO: ABNORMAL /HPF
UROBILINOGEN UR STRIP-ACNC: 0.2
WBC #/AREA URNS AUTO: ABNORMAL /HPF

## 2024-05-12 PROCEDURE — 81025 URINE PREGNANCY TEST: CPT | Performed by: STUDENT IN AN ORGANIZED HEALTH CARE EDUCATION/TRAINING PROGRAM

## 2024-05-12 PROCEDURE — 87591 N.GONORRHOEAE DNA AMP PROB: CPT | Performed by: STUDENT IN AN ORGANIZED HEALTH CARE EDUCATION/TRAINING PROGRAM

## 2024-05-12 PROCEDURE — 81001 URINALYSIS AUTO W/SCOPE: CPT | Performed by: STUDENT IN AN ORGANIZED HEALTH CARE EDUCATION/TRAINING PROGRAM

## 2024-05-12 PROCEDURE — 96372 THER/PROPH/DIAG INJ SC/IM: CPT | Performed by: STUDENT IN AN ORGANIZED HEALTH CARE EDUCATION/TRAINING PROGRAM

## 2024-05-12 PROCEDURE — 63600175 PHARM REV CODE 636 W HCPCS: Performed by: STUDENT IN AN ORGANIZED HEALTH CARE EDUCATION/TRAINING PROGRAM

## 2024-05-12 PROCEDURE — 99284 EMERGENCY DEPT VISIT MOD MDM: CPT | Mod: 25

## 2024-05-12 PROCEDURE — 87086 URINE CULTURE/COLONY COUNT: CPT | Performed by: STUDENT IN AN ORGANIZED HEALTH CARE EDUCATION/TRAINING PROGRAM

## 2024-05-12 RX ORDER — CEFTRIAXONE 1 G/1
1 INJECTION, POWDER, FOR SOLUTION INTRAMUSCULAR; INTRAVENOUS
Status: COMPLETED | OUTPATIENT
Start: 2024-05-12 | End: 2024-05-12

## 2024-05-12 RX ORDER — NITROFURANTOIN 25; 75 MG/1; MG/1
100 CAPSULE ORAL 2 TIMES DAILY
Qty: 14 CAPSULE | Refills: 0 | Status: SHIPPED | OUTPATIENT
Start: 2024-05-12 | End: 2024-05-19

## 2024-05-12 RX ORDER — LIDOCAINE HYDROCHLORIDE 10 MG/ML
2.1 INJECTION, SOLUTION EPIDURAL; INFILTRATION; INTRACAUDAL; PERINEURAL
Status: DISCONTINUED | OUTPATIENT
Start: 2024-05-12 | End: 2024-05-13 | Stop reason: HOSPADM

## 2024-05-12 RX ADMIN — CEFTRIAXONE SODIUM 1 G: 1 INJECTION, POWDER, FOR SOLUTION INTRAMUSCULAR; INTRAVENOUS at 10:05

## 2024-05-12 NOTE — Clinical Note
"Jina Manrique" Cam was seen and treated in our emergency department on 5/12/2024.  She may return to work on 05/13/2024.       If you have any questions or concerns, please don't hesitate to call.      hali saunders RN    "

## 2024-05-13 LAB
C TRACH DNA SPEC QL NAA+PROBE: DETECTED
N GONORRHOEA DNA SPEC QL NAA+PROBE: NOT DETECTED
SOURCE (OHS): ABNORMAL

## 2024-05-13 NOTE — ED PROVIDER NOTES
Encounter Date: 5/12/2024       History     Chief Complaint   Patient presents with    Dysuria     Dysuria, lower abd  and back pain since Friday. Verbalizes concern for UTI.      Patient is a 24-year-old  female with no past medical history presents to the ER today with dysuria.  Patient states symptom onset was 3 days prior to arrival.  She states she was some lower abdominal pressure but denies pain.  She states she would similar symptoms in the past when she had a UTI.  She denies any fevers, chills, chest pain shortness of breath.  She denies any vaginal discharge or bleeding but it was amenable to STD testing without empiric treatment today.      Review of patient's allergies indicates:  No Known Allergies  No past medical history on file.  No past surgical history on file.  No family history on file.     Review of Systems   Constitutional:  Negative for chills, fatigue and fever.   HENT:  Negative for congestion, sore throat and trouble swallowing.    Eyes:  Negative for pain and visual disturbance.   Respiratory:  Negative for cough, shortness of breath and wheezing.    Cardiovascular:  Negative for chest pain and palpitations.   Gastrointestinal:  Negative for abdominal pain, blood in stool, constipation, diarrhea, nausea and vomiting.   Genitourinary:  Positive for dysuria. Negative for hematuria.   Musculoskeletal:  Negative for back pain and myalgias.   Skin:  Negative for rash and wound.   Neurological:  Negative for seizures, syncope and headaches.   Psychiatric/Behavioral:  Negative for confusion. The patient is not nervous/anxious.        Physical Exam     Initial Vitals [05/12/24 2207]   BP Pulse Resp Temp SpO2   138/85 88 18 98.1 °F (36.7 °C) 96 %      MAP       --         Physical Exam    Nursing note and vitals reviewed.  Constitutional: She appears well-developed and well-nourished. She is not diaphoretic. No distress.   HENT:   Head: Normocephalic.   Right Ear: External ear  normal.   Left Ear: External ear normal.   Nose: Nose normal.   Eyes: Conjunctivae and EOM are normal. Right eye exhibits no discharge. Left eye exhibits no discharge. No scleral icterus.   Neck:   Normal range of motion.  Cardiovascular:  Normal rate, regular rhythm and normal heart sounds.     Exam reveals no gallop and no friction rub.       No murmur heard.  Pulmonary/Chest: Breath sounds normal. No stridor. No respiratory distress. She has no wheezes. She has no rhonchi. She has no rales.   Abdominal: Abdomen is soft. She exhibits no distension. There is no abdominal tenderness.   Negative Miranda sign.  No pain at McBurney's point.  Abdomen is soft diffusely with no rebound tenderness or guarding.  Negative for CVA tenderness bilaterally. There is no rebound and no guarding.   Musculoskeletal:         General: Normal range of motion.      Cervical back: Normal range of motion.     Neurological: She is alert.   Skin: Skin is warm. No rash noted. No erythema.   Psychiatric: She has a normal mood and affect. Her behavior is normal.         ED Course   Procedures  Labs Reviewed   URINALYSIS, REFLEX TO URINE CULTURE - Abnormal; Notable for the following components:       Result Value    Leukocyte Esterase, UA Small (*)     All other components within normal limits   URINALYSIS, MICROSCOPIC - Abnormal; Notable for the following components:    Bacteria, UA Few (*)     WBC, UA 51-99 (*)     Squamous Epithelial Cells, UA Few (*)     All other components within normal limits   PREGNANCY TEST, URINE RAPID - Normal   CULTURE, URINE   CHLAMYDIA/GONORRHOEAE(GC), PCR          Imaging Results    None          Medications   cefTRIAXone injection 1 g (has no administration in time range)   LIDOcaine (PF) 10 mg/ml (1%) injection 21 mg (has no administration in time range)     Medical Decision Making  Differentials: UTI, STI, pyelonephritis, other intra-abdominal cause  Historian is the patient   24-year-old well-appearing female  with stable vital signs presents to the ER today with dysuria.  Denies any symptoms of STIs but would like further testing thus urine was sent off for this as well.  She understands this has not full STD testing will need further STD workup from her PCP, OBGYN or health unit.  Urine studies consistent with UTI in his no clinical evidence of pyelonephritis on exam.  Rocephin was given here and Macrobid was sent to pharmacy.  ER return precautions were discussed, all questions were answered in layman's terms and follow up with PCP is recommended.  Patient states she will download MyChart to follow up with her chlamydia and gonorrhea results.    Amount and/or Complexity of Data Reviewed  Labs: ordered. Decision-making details documented in ED Course.                                      Clinical Impression:  Final diagnoses:  [N30.00] Acute cystitis without hematuria (Primary)          ED Disposition Condition    Discharge Stable          ED Prescriptions       Medication Sig Dispense Start Date End Date Auth. Provider    nitrofurantoin, macrocrystal-monohydrate, (MACROBID) 100 MG capsule Take 1 capsule (100 mg total) by mouth 2 (two) times daily. for 7 days 14 capsule 5/12/2024 5/19/2024 Esteban Galaviz MD          Follow-up Information       Follow up With Specialties Details Why Contact Info    [facility] - Emergency Dept Emergency Medicine  If symptoms worsen [address] [place] [id number] [phone]             Esteban Galaviz MD  05/12/24 9204

## 2024-05-15 LAB — BACTERIA UR CULT: ABNORMAL

## 2024-07-14 ENCOUNTER — HOSPITAL ENCOUNTER (EMERGENCY)
Facility: HOSPITAL | Age: 24
Discharge: HOME | End: 2024-07-14
Attending: STUDENT IN AN ORGANIZED HEALTH CARE EDUCATION/TRAINING PROGRAM
Payer: MEDICAID

## 2024-07-14 VITALS
HEART RATE: 79 BPM | TEMPERATURE: 98 F | RESPIRATION RATE: 18 BRPM | DIASTOLIC BLOOD PRESSURE: 91 MMHG | HEIGHT: 69 IN | BODY MASS INDEX: 43.4 KG/M2 | WEIGHT: 293 LBS | OXYGEN SATURATION: 100 % | SYSTOLIC BLOOD PRESSURE: 164 MMHG

## 2024-07-14 DIAGNOSIS — Z20.2 STD EXPOSURE: Primary | ICD-10-CM

## 2024-07-14 LAB
B-HCG UR QL: NEGATIVE
BACTERIA #/AREA URNS AUTO: ABNORMAL /HPF
BILIRUB UR QL STRIP.AUTO: NEGATIVE
CLARITY UR: ABNORMAL
COLOR UR AUTO: YELLOW
GLUCOSE UR QL STRIP: NEGATIVE
HGB UR QL STRIP: NEGATIVE
KETONES UR QL STRIP: NEGATIVE
LEUKOCYTE ESTERASE UR QL STRIP: ABNORMAL
NITRITE UR QL STRIP: NEGATIVE
PH UR STRIP: 6 [PH]
PROT UR QL STRIP: NEGATIVE
RBC #/AREA URNS AUTO: ABNORMAL /HPF
SP GR UR STRIP.AUTO: 1.02 (ref 1–1.03)
SQUAMOUS #/AREA URNS AUTO: ABNORMAL /HPF
TRICHOMONAS UR QL COMP ASSIST: ABNORMAL /HPF
UROBILINOGEN UR STRIP-ACNC: 0.2
WBC #/AREA URNS AUTO: ABNORMAL /HPF

## 2024-07-14 PROCEDURE — 87491 CHLMYD TRACH DNA AMP PROBE: CPT | Performed by: STUDENT IN AN ORGANIZED HEALTH CARE EDUCATION/TRAINING PROGRAM

## 2024-07-14 PROCEDURE — 63600175 PHARM REV CODE 636 W HCPCS: Performed by: STUDENT IN AN ORGANIZED HEALTH CARE EDUCATION/TRAINING PROGRAM

## 2024-07-14 PROCEDURE — 81025 URINE PREGNANCY TEST: CPT | Performed by: STUDENT IN AN ORGANIZED HEALTH CARE EDUCATION/TRAINING PROGRAM

## 2024-07-14 PROCEDURE — 25000003 PHARM REV CODE 250: Performed by: STUDENT IN AN ORGANIZED HEALTH CARE EDUCATION/TRAINING PROGRAM

## 2024-07-14 PROCEDURE — 99284 EMERGENCY DEPT VISIT MOD MDM: CPT | Mod: 25

## 2024-07-14 PROCEDURE — 87086 URINE CULTURE/COLONY COUNT: CPT | Performed by: STUDENT IN AN ORGANIZED HEALTH CARE EDUCATION/TRAINING PROGRAM

## 2024-07-14 PROCEDURE — 81003 URINALYSIS AUTO W/O SCOPE: CPT | Performed by: STUDENT IN AN ORGANIZED HEALTH CARE EDUCATION/TRAINING PROGRAM

## 2024-07-14 PROCEDURE — 96372 THER/PROPH/DIAG INJ SC/IM: CPT | Performed by: STUDENT IN AN ORGANIZED HEALTH CARE EDUCATION/TRAINING PROGRAM

## 2024-07-14 RX ORDER — DOXYCYCLINE 100 MG/1
100 CAPSULE ORAL 2 TIMES DAILY
Qty: 14 CAPSULE | Refills: 0 | Status: SHIPPED | OUTPATIENT
Start: 2024-07-14 | End: 2024-07-21

## 2024-07-14 RX ORDER — DOXYCYCLINE HYCLATE 100 MG
100 TABLET ORAL
Status: COMPLETED | OUTPATIENT
Start: 2024-07-14 | End: 2024-07-14

## 2024-07-14 RX ADMIN — DOXYCYCLINE HYCLATE 100 MG: 100 TABLET, COATED ORAL at 11:07

## 2024-07-14 RX ADMIN — CEFTRIAXONE SODIUM 1 G: 500 INJECTION, POWDER, FOR SOLUTION INTRAMUSCULAR; INTRAVENOUS at 11:07

## 2024-07-15 LAB
C TRACH DNA SPEC QL NAA+PROBE: NOT DETECTED
N GONORRHOEA DNA SPEC QL NAA+PROBE: NOT DETECTED
SOURCE (OHS): NORMAL

## 2024-07-15 NOTE — ED PROVIDER NOTES
Encounter Date: 7/14/2024       History     Chief Complaint   Patient presents with    Exposure to STD     Pt reports to ED c/o R lower abd pain w/ possible exposure to STD. States partner was + for chlamydia and gonorrhea STD. Denies dysuria, N/V.     HPI  Patient is a 24-year-old female who presents to the ED after STD exposure.  Patient states that her significant other tested positive for chlamydia.  She endorses some discharge vaginally that is new.  She denies any fever, chills, dysuria, nausea or vomiting  Review of patient's allergies indicates:  No Known Allergies  No past medical history on file.  No past surgical history on file.  No family history on file.     Review of Systems   Constitutional:  Negative for fever.   HENT:  Negative for sore throat.    Eyes:  Negative for photophobia and visual disturbance.   Respiratory:  Negative for shortness of breath.    Cardiovascular:  Negative for chest pain.   Gastrointestinal:  Negative for nausea.   Endocrine: Negative for polyuria.   Genitourinary:  Positive for vaginal discharge. Negative for dysuria.   Musculoskeletal:  Negative for back pain.   Skin:  Negative for rash.   Neurological:  Negative for weakness.   Hematological:  Does not bruise/bleed easily.   All other systems reviewed and are negative.      Physical Exam     Initial Vitals [07/14/24 2223]   BP Pulse Resp Temp SpO2   (!) 164/91 79 18 98.3 °F (36.8 °C) 100 %      MAP       --         Physical Exam    Nursing note and vitals reviewed.  Constitutional: Vital signs are normal. She appears well-developed and well-nourished. She is not diaphoretic. She is active.  Non-toxic appearance. She does not appear ill. No distress.   HENT:   Head: Normocephalic and atraumatic.   Eyes: Conjunctivae are normal. Pupils are equal, round, and reactive to light. Right conjunctiva is not injected. Left conjunctiva is not injected.   Neck: Trachea normal. Neck supple.   Normal range of motion.   Full passive  range of motion without pain.     Cardiovascular:  Normal rate, regular rhythm, S1 normal, S2 normal, intact distal pulses and normal pulses.           Pulmonary/Chest: Breath sounds normal. No respiratory distress. She has no wheezes.   Abdominal: Abdomen is soft. Bowel sounds are normal. There is no abdominal tenderness.   Musculoskeletal:      Cervical back: Full passive range of motion without pain, normal range of motion and neck supple. No rigidity.      Right lower leg: No swelling. No edema.      Left lower leg: No swelling. No edema.     Neurological: She is alert and oriented to person, place, and time.   Skin: Skin is warm and dry. Capillary refill takes less than 2 seconds.         ED Course   Procedures  Labs Reviewed   CHLAMYDIA/GONORRHOEAE(GC), PCR   URINALYSIS, REFLEX TO URINE CULTURE   PREGNANCY TEST, URINE RAPID          Imaging Results    None          Medications   cefTRIAXone (ROCEPHIN) 1 g in LIDOcaine HCL 10 mg/ml (1%) 4 mL IM only syringe (has no administration in time range)   doxycycline tablet 100 mg (has no administration in time range)     Medical Decision Making  Patient is a 24-year-old female who presents to the ED after STD exposure.  Patient states that her significant other tested positive for chlamydia.    Differential diagnosis includes but not limited to: STD, UTI, pregnancy     As patient's partner tested positive for chlamydia, will presumptively treat.  UA, UPT, GC/chlamydia sent to lab.  Patient given Rocephin, doxycycline here in the ED.  She has been counseled on safe sexual practices.  Patient stable for discharge.  Strict return precautions given.  Patient encouraged to follow with the PCP, SARAHI.    Bobby Weaver M.D.  Emergency Medicine        Risk  Prescription drug management.                                      Clinical Impression:  Final diagnoses:  [Z20.2] STD exposure (Primary)          ED Disposition Condition    Discharge Stable          ED Prescriptions        Medication Sig Dispense Start Date End Date Auth. Provider    doxycycline (VIBRAMYCIN) 100 MG Cap Take 1 capsule (100 mg total) by mouth 2 (two) times daily. for 7 days 14 capsule 7/14/2024 7/21/2024 Bobby Weaver MD          Follow-up Information       Follow up With Specialties Details Why Contact Info    Your primary care provider  Schedule an appointment as soon as possible for a visit in 2 days For follow up     [facility] - Emergency Dept Emergency Medicine  If symptoms worsen [address] [facility] [id number] [phone]             Bobby Weaver MD  07/14/24 8520

## 2024-07-17 LAB — BACTERIA UR CULT: NORMAL

## 2024-07-29 VITALS
WEIGHT: 293 LBS | OXYGEN SATURATION: 100 % | SYSTOLIC BLOOD PRESSURE: 159 MMHG | TEMPERATURE: 98 F | HEART RATE: 81 BPM | BODY MASS INDEX: 43.4 KG/M2 | HEIGHT: 69 IN | DIASTOLIC BLOOD PRESSURE: 108 MMHG | RESPIRATION RATE: 18 BRPM

## 2024-07-29 PROCEDURE — 99284 EMERGENCY DEPT VISIT MOD MDM: CPT

## 2024-07-30 ENCOUNTER — HOSPITAL ENCOUNTER (EMERGENCY)
Facility: HOSPITAL | Age: 24
Discharge: HOME | End: 2024-07-30
Attending: FAMILY MEDICINE
Payer: MEDICAID

## 2024-07-30 DIAGNOSIS — K29.70 GASTRITIS, PRESENCE OF BLEEDING UNSPECIFIED, UNSPECIFIED CHRONICITY, UNSPECIFIED GASTRITIS TYPE: ICD-10-CM

## 2024-07-30 DIAGNOSIS — R10.84 GENERALIZED ABDOMINAL PAIN: Primary | ICD-10-CM

## 2024-07-30 DIAGNOSIS — R10.13 EPIGASTRIC ABDOMINAL PAIN: ICD-10-CM

## 2024-07-30 LAB
ALBUMIN SERPL-MCNC: 3.8 G/DL (ref 3.5–5)
ALBUMIN/GLOB SERPL: 1 RATIO (ref 1.1–2)
ALP SERPL-CCNC: 105 UNIT/L (ref 40–150)
ALT SERPL-CCNC: 24 UNIT/L (ref 0–55)
ANION GAP SERPL CALC-SCNC: 7 MEQ/L
AST SERPL-CCNC: 41 UNIT/L (ref 5–34)
B-HCG UR QL: NEGATIVE
BACTERIA #/AREA URNS AUTO: ABNORMAL /HPF
BASOPHILS # BLD AUTO: 0.02 X10(3)/MCL
BASOPHILS NFR BLD AUTO: 0.2 %
BILIRUB SERPL-MCNC: 0.4 MG/DL
BILIRUB UR QL STRIP.AUTO: NEGATIVE
BUN SERPL-MCNC: 12.3 MG/DL (ref 7–18.7)
CALCIUM SERPL-MCNC: 9.5 MG/DL (ref 8.4–10.2)
CHLORIDE SERPL-SCNC: 108 MMOL/L (ref 98–107)
CLARITY UR: ABNORMAL
CO2 SERPL-SCNC: 24 MMOL/L (ref 22–29)
COLOR UR AUTO: ABNORMAL
CREAT SERPL-MCNC: 0.77 MG/DL (ref 0.55–1.02)
CREAT/UREA NIT SERPL: 16
CTP QC/QA: YES
EOSINOPHIL # BLD AUTO: 0.07 X10(3)/MCL (ref 0–0.9)
EOSINOPHIL NFR BLD AUTO: 0.8 %
ERYTHROCYTE [DISTWIDTH] IN BLOOD BY AUTOMATED COUNT: 15.5 % (ref 11.5–17)
GFR SERPLBLD CREATININE-BSD FMLA CKD-EPI: >60 ML/MIN/1.73/M2
GLOBULIN SER-MCNC: 3.7 GM/DL (ref 2.4–3.5)
GLUCOSE SERPL-MCNC: 111 MG/DL (ref 74–100)
GLUCOSE UR QL STRIP: NORMAL
HCT VFR BLD AUTO: 38.3 % (ref 37–47)
HGB BLD-MCNC: 11.9 G/DL (ref 12–16)
HGB UR QL STRIP: NEGATIVE
HYALINE CASTS #/AREA URNS LPF: ABNORMAL /LPF
IMM GRANULOCYTES # BLD AUTO: 0.04 X10(3)/MCL (ref 0–0.04)
IMM GRANULOCYTES NFR BLD AUTO: 0.5 %
KETONES UR QL STRIP: NEGATIVE
LEUKOCYTE ESTERASE UR QL STRIP: 250
LIPASE SERPL-CCNC: 42 U/L
LYMPHOCYTES # BLD AUTO: 1.82 X10(3)/MCL (ref 0.6–4.6)
LYMPHOCYTES NFR BLD AUTO: 21.3 %
MCH RBC QN AUTO: 22.5 PG (ref 27–31)
MCHC RBC AUTO-ENTMCNC: 31.1 G/DL (ref 33–36)
MCV RBC AUTO: 72.4 FL (ref 80–94)
MONOCYTES # BLD AUTO: 0.56 X10(3)/MCL (ref 0.1–1.3)
MONOCYTES NFR BLD AUTO: 6.6 %
MUCOUS THREADS URNS QL MICRO: ABNORMAL /LPF
NEUTROPHILS # BLD AUTO: 6.02 X10(3)/MCL (ref 2.1–9.2)
NEUTROPHILS NFR BLD AUTO: 70.6 %
NITRITE UR QL STRIP: NEGATIVE
NRBC BLD AUTO-RTO: 0 %
PH UR STRIP: 6 [PH]
PLATELET # BLD AUTO: 273 X10(3)/MCL (ref 130–400)
PMV BLD AUTO: 9.8 FL (ref 7.4–10.4)
POTASSIUM SERPL-SCNC: 3.8 MMOL/L (ref 3.5–5.1)
PROT SERPL-MCNC: 7.5 GM/DL (ref 6.4–8.3)
PROT UR QL STRIP: ABNORMAL
RBC # BLD AUTO: 5.29 X10(6)/MCL (ref 4.2–5.4)
RBC #/AREA URNS AUTO: ABNORMAL /HPF
SODIUM SERPL-SCNC: 139 MMOL/L (ref 136–145)
SP GR UR STRIP.AUTO: 1.03 (ref 1–1.03)
SQUAMOUS #/AREA URNS LPF: ABNORMAL /HPF
UNIDENT CRYS #/AREA URNS HPF: ABNORMAL /HPF
UROBILINOGEN UR STRIP-ACNC: NORMAL
WBC # BLD AUTO: 8.53 X10(3)/MCL (ref 4.5–11.5)
WBC #/AREA URNS AUTO: ABNORMAL /HPF

## 2024-07-30 PROCEDURE — 87086 URINE CULTURE/COLONY COUNT: CPT | Performed by: FAMILY MEDICINE

## 2024-07-30 PROCEDURE — 85025 COMPLETE CBC W/AUTO DIFF WBC: CPT | Performed by: FAMILY MEDICINE

## 2024-07-30 PROCEDURE — 87077 CULTURE AEROBIC IDENTIFY: CPT | Performed by: FAMILY MEDICINE

## 2024-07-30 PROCEDURE — 81025 URINE PREGNANCY TEST: CPT | Performed by: FAMILY MEDICINE

## 2024-07-30 PROCEDURE — 25000003 PHARM REV CODE 250: Performed by: FAMILY MEDICINE

## 2024-07-30 PROCEDURE — 80053 COMPREHEN METABOLIC PANEL: CPT | Performed by: FAMILY MEDICINE

## 2024-07-30 PROCEDURE — 81001 URINALYSIS AUTO W/SCOPE: CPT | Performed by: FAMILY MEDICINE

## 2024-07-30 PROCEDURE — 83690 ASSAY OF LIPASE: CPT | Performed by: FAMILY MEDICINE

## 2024-07-30 RX ORDER — FAMOTIDINE 20 MG/1
20 TABLET, FILM COATED ORAL 2 TIMES DAILY
Qty: 20 TABLET | Refills: 0 | Status: SHIPPED | OUTPATIENT
Start: 2024-07-30 | End: 2024-08-09

## 2024-07-30 RX ORDER — ONDANSETRON 4 MG/1
4 TABLET, ORALLY DISINTEGRATING ORAL EVERY 6 HOURS PRN
Qty: 10 TABLET | Refills: 0 | Status: SHIPPED | OUTPATIENT
Start: 2024-07-30 | End: 2024-08-04

## 2024-07-30 RX ADMIN — ALUMINUM HYDROXIDE AND MAGNESIUM HYDROXIDE 30 ML: 200; 200 SUSPENSION ORAL at 02:07

## 2024-08-01 LAB — BACTERIA UR CULT: ABNORMAL

## 2024-10-15 PROCEDURE — 87661 TRICHOMONAS VAGINALIS AMPLIF: CPT | Performed by: OBSTETRICS & GYNECOLOGY

## 2024-10-15 PROCEDURE — 87624 HPV HI-RISK TYP POOLED RSLT: CPT | Performed by: OBSTETRICS & GYNECOLOGY

## 2024-10-15 PROCEDURE — 87491 CHLMYD TRACH DNA AMP PROBE: CPT | Performed by: OBSTETRICS & GYNECOLOGY

## 2024-10-15 PROCEDURE — 87591 N.GONORRHOEAE DNA AMP PROB: CPT | Performed by: OBSTETRICS & GYNECOLOGY

## 2025-01-27 ENCOUNTER — LAB VISIT (OUTPATIENT)
Dept: LAB | Facility: HOSPITAL | Age: 25
End: 2025-01-27
Attending: OBSTETRICS & GYNECOLOGY
Payer: MEDICAID

## 2025-01-27 DIAGNOSIS — Z34.81 ENCOUNTER FOR SUPERVISION OF NORMAL PREGNANCY IN MULTIGRAVIDA IN FIRST TRIMESTER: ICD-10-CM

## 2025-01-27 DIAGNOSIS — Z03.89 ENCNTR FOR OBS FOR OTH SUSPECTED DISEASES AND COND RULED OUT: ICD-10-CM

## 2025-01-27 LAB
ABORH RETYPE: NORMAL
BASOPHILS # BLD AUTO: 0.02 X10(3)/MCL
BASOPHILS NFR BLD AUTO: 0.2 %
EOSINOPHIL # BLD AUTO: 0.15 X10(3)/MCL (ref 0–0.9)
EOSINOPHIL NFR BLD AUTO: 1.7 %
ERYTHROCYTE [DISTWIDTH] IN BLOOD BY AUTOMATED COUNT: 14.9 % (ref 11.5–17)
GROUP & RH: NORMAL
HBV SURFACE AG SERPL QL IA: NONREACTIVE
HCT VFR BLD AUTO: 34.3 % (ref 37–47)
HCV AB SERPL QL IA: NONREACTIVE
HGB BLD-MCNC: 11 G/DL (ref 12–16)
HIV 1+2 AB+HIV1 P24 AG SERPL QL IA: NONREACTIVE
IMM GRANULOCYTES # BLD AUTO: 0.09 X10(3)/MCL (ref 0–0.04)
IMM GRANULOCYTES NFR BLD AUTO: 1 %
INDIRECT COOMBS: NORMAL
LYMPHOCYTES # BLD AUTO: 1.94 X10(3)/MCL (ref 0.6–4.6)
LYMPHOCYTES NFR BLD AUTO: 21.8 %
MCH RBC QN AUTO: 22.9 PG (ref 27–31)
MCHC RBC AUTO-ENTMCNC: 32.1 G/DL (ref 33–36)
MCV RBC AUTO: 71.5 FL (ref 80–94)
MONOCYTES # BLD AUTO: 0.37 X10(3)/MCL (ref 0.1–1.3)
MONOCYTES NFR BLD AUTO: 4.2 %
NEUTROPHILS # BLD AUTO: 6.33 X10(3)/MCL (ref 2.1–9.2)
NEUTROPHILS NFR BLD AUTO: 71.1 %
PLATELET # BLD AUTO: 279 X10(3)/MCL (ref 130–400)
PMV BLD AUTO: 10.1 FL (ref 7.4–10.4)
RBC # BLD AUTO: 4.8 X10(6)/MCL (ref 4.2–5.4)
SPECIMEN OUTDATE: NORMAL
T4 FREE SERPL-MCNC: 0.77 NG/DL (ref 0.7–1.48)
TSH SERPL-ACNC: 0.65 UIU/ML (ref 0.35–4.94)
WBC # BLD AUTO: 8.9 X10(3)/MCL (ref 4.5–11.5)

## 2025-01-27 PROCEDURE — 87389 HIV-1 AG W/HIV-1&-2 AB AG IA: CPT

## 2025-01-27 PROCEDURE — 85025 COMPLETE CBC W/AUTO DIFF WBC: CPT

## 2025-01-27 PROCEDURE — 87340 HEPATITIS B SURFACE AG IA: CPT

## 2025-01-27 PROCEDURE — 86762 RUBELLA ANTIBODY: CPT

## 2025-01-27 PROCEDURE — 84439 ASSAY OF FREE THYROXINE: CPT

## 2025-01-27 PROCEDURE — 36415 COLL VENOUS BLD VENIPUNCTURE: CPT | Mod: 91

## 2025-01-27 PROCEDURE — 86850 RBC ANTIBODY SCREEN: CPT | Performed by: OBSTETRICS & GYNECOLOGY

## 2025-01-27 PROCEDURE — 84443 ASSAY THYROID STIM HORMONE: CPT

## 2025-01-27 PROCEDURE — 86787 VARICELLA-ZOSTER ANTIBODY: CPT

## 2025-01-27 PROCEDURE — 86780 TREPONEMA PALLIDUM: CPT

## 2025-01-27 PROCEDURE — 86803 HEPATITIS C AB TEST: CPT

## 2025-01-28 LAB — T PALLIDUM AB SER QL: NONREACTIVE

## 2025-01-29 LAB
RUBV IGG SERPL IA-ACNC: 1.2
RUBV IGG SERPL QL IA: POSITIVE
VZV IGG SER IA-ACNC: 0.4
VZV IGG SER QL IA: NEGATIVE

## 2025-02-11 ENCOUNTER — HOSPITAL ENCOUNTER (EMERGENCY)
Facility: HOSPITAL | Age: 25
Discharge: HOME | End: 2025-02-11
Attending: SPECIALIST
Payer: MEDICAID

## 2025-02-11 ENCOUNTER — LAB VISIT (OUTPATIENT)
Dept: LAB | Facility: HOSPITAL | Age: 25
End: 2025-02-11
Attending: OBSTETRICS & GYNECOLOGY
Payer: MEDICAID

## 2025-02-11 VITALS
WEIGHT: 293 LBS | DIASTOLIC BLOOD PRESSURE: 74 MMHG | SYSTOLIC BLOOD PRESSURE: 121 MMHG | HEIGHT: 69 IN | OXYGEN SATURATION: 99 % | BODY MASS INDEX: 43.4 KG/M2 | HEART RATE: 81 BPM | TEMPERATURE: 98 F | RESPIRATION RATE: 20 BRPM

## 2025-02-11 DIAGNOSIS — R10.13 EPIGASTRIC PAIN: Primary | ICD-10-CM

## 2025-02-11 DIAGNOSIS — Z34.82 SUPERVISION OF NORMAL INTRAUTERINE PREGNANCY IN MULTIGRAVIDA, SECOND TRIMESTER: ICD-10-CM

## 2025-02-11 DIAGNOSIS — R14.1 ABDOMINAL GAS PAIN: ICD-10-CM

## 2025-02-11 LAB
ALBUMIN SERPL-MCNC: 2.8 G/DL (ref 3.5–5)
ALBUMIN/GLOB SERPL: 0.7 RATIO (ref 1.1–2)
ALP SERPL-CCNC: 61 UNIT/L (ref 40–150)
ALT SERPL-CCNC: 15 UNIT/L (ref 0–55)
ANION GAP SERPL CALC-SCNC: 10 MEQ/L
AST SERPL-CCNC: 18 UNIT/L (ref 5–34)
BASOPHILS # BLD AUTO: 0.02 X10(3)/MCL
BASOPHILS NFR BLD AUTO: 0.2 %
BILIRUB SERPL-MCNC: 0.3 MG/DL
BUN SERPL-MCNC: 15.7 MG/DL (ref 7–18.7)
CALCIUM SERPL-MCNC: 9.2 MG/DL (ref 8.4–10.2)
CHLORIDE SERPL-SCNC: 107 MMOL/L (ref 98–107)
CO2 SERPL-SCNC: 21 MMOL/L (ref 22–29)
CREAT SERPL-MCNC: 0.62 MG/DL (ref 0.55–1.02)
CREAT/UREA NIT SERPL: 25
EOSINOPHIL # BLD AUTO: 0.23 X10(3)/MCL (ref 0–0.9)
EOSINOPHIL NFR BLD AUTO: 1.9 %
ERYTHROCYTE [DISTWIDTH] IN BLOOD BY AUTOMATED COUNT: 14.9 % (ref 11.5–17)
GFR SERPLBLD CREATININE-BSD FMLA CKD-EPI: >60 ML/MIN/1.73/M2
GLOBULIN SER-MCNC: 4 GM/DL (ref 2.4–3.5)
GLUCOSE 1H P 100 G GLC PO SERPL-MCNC: 102 MG/DL (ref 100–180)
GLUCOSE SERPL-MCNC: 91 MG/DL (ref 74–100)
HCT VFR BLD AUTO: 34.2 % (ref 37–47)
HGB BLD-MCNC: 10.6 G/DL (ref 12–16)
IMM GRANULOCYTES # BLD AUTO: 0.22 X10(3)/MCL (ref 0–0.04)
IMM GRANULOCYTES NFR BLD AUTO: 1.8 %
LIPASE SERPL-CCNC: 31 U/L
LYMPHOCYTES # BLD AUTO: 3.74 X10(3)/MCL (ref 0.6–4.6)
LYMPHOCYTES NFR BLD AUTO: 31.3 %
MCH RBC QN AUTO: 22.2 PG (ref 27–31)
MCHC RBC AUTO-ENTMCNC: 31 G/DL (ref 33–36)
MCV RBC AUTO: 71.5 FL (ref 80–94)
MONOCYTES # BLD AUTO: 0.91 X10(3)/MCL (ref 0.1–1.3)
MONOCYTES NFR BLD AUTO: 7.6 %
NEUTROPHILS # BLD AUTO: 6.84 X10(3)/MCL (ref 2.1–9.2)
NEUTROPHILS NFR BLD AUTO: 57.2 %
PLATELET # BLD AUTO: 313 X10(3)/MCL (ref 130–400)
PMV BLD AUTO: 10 FL (ref 7.4–10.4)
POTASSIUM SERPL-SCNC: 3.5 MMOL/L (ref 3.5–5.1)
PROT SERPL-MCNC: 6.8 GM/DL (ref 6.4–8.3)
RBC # BLD AUTO: 4.78 X10(6)/MCL (ref 4.2–5.4)
SODIUM SERPL-SCNC: 138 MMOL/L (ref 136–145)
WBC # BLD AUTO: 11.96 X10(3)/MCL (ref 4.5–11.5)

## 2025-02-11 PROCEDURE — 82950 GLUCOSE TEST: CPT

## 2025-02-11 PROCEDURE — 63600175 PHARM REV CODE 636 W HCPCS: Performed by: SPECIALIST

## 2025-02-11 PROCEDURE — 83690 ASSAY OF LIPASE: CPT | Performed by: SPECIALIST

## 2025-02-11 PROCEDURE — 85025 COMPLETE CBC W/AUTO DIFF WBC: CPT | Performed by: SPECIALIST

## 2025-02-11 PROCEDURE — 96372 THER/PROPH/DIAG INJ SC/IM: CPT | Performed by: SPECIALIST

## 2025-02-11 PROCEDURE — 25000003 PHARM REV CODE 250: Performed by: SPECIALIST

## 2025-02-11 PROCEDURE — 96361 HYDRATE IV INFUSION ADD-ON: CPT

## 2025-02-11 PROCEDURE — 96374 THER/PROPH/DIAG INJ IV PUSH: CPT

## 2025-02-11 PROCEDURE — 80053 COMPREHEN METABOLIC PANEL: CPT | Performed by: SPECIALIST

## 2025-02-11 PROCEDURE — 99284 EMERGENCY DEPT VISIT MOD MDM: CPT | Mod: 25

## 2025-02-11 RX ORDER — DICYCLOMINE HYDROCHLORIDE 10 MG/ML
20 INJECTION INTRAMUSCULAR
Status: COMPLETED | OUTPATIENT
Start: 2025-02-11 | End: 2025-02-11

## 2025-02-11 RX ORDER — ONDANSETRON HYDROCHLORIDE 2 MG/ML
4 INJECTION, SOLUTION INTRAVENOUS
Status: COMPLETED | OUTPATIENT
Start: 2025-02-11 | End: 2025-02-11

## 2025-02-11 RX ORDER — FAMOTIDINE 20 MG/1
40 TABLET, FILM COATED ORAL
Status: COMPLETED | OUTPATIENT
Start: 2025-02-11 | End: 2025-02-11

## 2025-02-11 RX ADMIN — ONDANSETRON 4 MG: 2 INJECTION INTRAMUSCULAR; INTRAVENOUS at 01:02

## 2025-02-11 RX ADMIN — DICYCLOMINE HYDROCHLORIDE 20 MG: 10 INJECTION, SOLUTION INTRAMUSCULAR at 03:02

## 2025-02-11 RX ADMIN — FAMOTIDINE 40 MG: 20 TABLET, FILM COATED ORAL at 03:02

## 2025-02-11 RX ADMIN — SODIUM CHLORIDE 1000 ML: 9 INJECTION, SOLUTION INTRAVENOUS at 01:02

## 2025-02-11 NOTE — ED PROVIDER NOTES
"Encounter Date: 2025       History     Chief Complaint   Patient presents with    Abdominal Cramping     C/o upper abd pain with nausea  since midnight.  Pt [id number] due 25     24-year-old female who is 26 weeks pregnant presents with epigastric discomfort and states she feels like she has "trapped gas"; patient had an episode of emesis while in triage; states she became overheated; she denies fever or chills, no vaginal bleeding, no pelvic or lower abdominal pain    The history is provided by the patient.     Review of patient's allergies indicates:  No Known Allergies  No past medical history on file.  No past surgical history on file.  No family history on file.  Social History     Tobacco Use    Smoking status: Never    Smokeless tobacco: Never     Review of Systems   Constitutional: Negative.    HENT: Negative.     Respiratory: Negative.     Cardiovascular: Negative.    Gastrointestinal: Negative.    Musculoskeletal: Negative.    Skin: Negative.    Neurological: Negative.    All other systems reviewed and are negative.      Physical Exam     Initial Vitals [25 0130]   BP Pulse Resp Temp SpO2   (!) 100/54 (!) 58 20 98.4 °F (36.9 °C) 100 %      MAP       --         Physical Exam    Nursing note and vitals reviewed.  Constitutional: She appears well-developed and well-nourished.   HENT:   Head: Normocephalic and atraumatic. Mouth/Throat: Oropharynx is clear and moist.   Eyes: EOM are normal. Pupils are equal, round, and reactive to light.   Neck: Neck supple.   Normal range of motion.  Cardiovascular:  Normal rate, regular rhythm, normal heart sounds and intact distal pulses.           Pulmonary/Chest: Breath sounds normal.   Abdominal: Abdomen is soft. Bowel sounds are normal. She exhibits no distension. There is abdominal tenderness (Mild, epigastric). There is no rebound and no guarding.   Musculoskeletal:         General: Normal range of motion.      Cervical back: Normal range of motion and " neck supple.     Neurological: She is alert and oriented to person, place, and time. She has normal strength. GCS score is 15. GCS eye subscore is 4. GCS verbal subscore is 5. GCS motor subscore is 6.   Skin: Skin is warm and dry.         ED Course   Procedures  Labs Reviewed   COMPREHENSIVE METABOLIC PANEL - Abnormal       Result Value    Sodium 138      Potassium 3.5      Chloride 107      CO2 21 (*)     Glucose 91      Blood Urea Nitrogen 15.7      Creatinine 0.62      Calcium 9.2      Protein Total 6.8      Albumin 2.8 (*)     Globulin 4.0 (*)     Albumin/Globulin Ratio 0.7 (*)     Bilirubin Total 0.3      ALP 61      ALT 15      AST 18      eGFR >60      Anion Gap 10.0      BUN/Creatinine Ratio 25     CBC WITH DIFFERENTIAL - Abnormal    WBC 11.96 (*)     RBC 4.78      Hgb 10.6 (*)     Hct 34.2 (*)     MCV 71.5 (*)     MCH 22.2 (*)     MCHC 31.0 (*)     RDW 14.9      Platelet 313      MPV 10.0      Neut % 57.2      Lymph % 31.3      Mono % 7.6      Eos % 1.9      Basophil % 0.2      Imm Grans % 1.8      Neut # 6.84      Lymph # 3.74      Mono # 0.91      Eos # 0.23      Baso # 0.02      Imm Gran # 0.22 (*)    LIPASE - Normal    Lipase Level 31     CBC W/ AUTO DIFFERENTIAL    Narrative:     The following orders were created for panel order CBC auto differential.  Procedure                               Abnormality         Status                     ---------                               -----------         ------                     CBC with Differential[[id number]]       Abnormal            Final result                 Please view results for these tests on the individual orders.          Imaging Results    None          Medications   sodium chloride 0.9% bolus 1,000 mL 1,000 mL (0 mLs Intravenous Stopped 2/11/25 0258)   ondansetron injection 4 mg (4 mg Intravenous Given 2/11/25 6419)   famotidine tablet 40 mg (40 mg Oral Given 2/11/25 0315)   dicyclomine injection 20 mg (20 mg Intramuscular Given 2/11/25 0314)  "    Medical Decision Making  24-year-old female who is 26 weeks pregnant presents with epigastric discomfort and states she feels like she has "trapped gas"; patient had an episode of emesis while in triage; states she became overheated; she denies fever or chills, no vaginal bleeding, no pelvic or lower abdominal pain    DIFFERENTIAL DIAGNOSIS- gastritis, GERD, cholecystitis, pancreatitis, colitis, viral illness, electrolyte abnormality, dehydration    Amount and/or Complexity of Data Reviewed  External Data Reviewed: labs and notes.  Labs: ordered. Decision-making details documented in ED Course.    Risk  Prescription drug management.  Risk Details: She was given 1000 mL normal saline and Zofran 4 mg IV; no further nausea or vomiting; Symptoms completely resolved with Bentyl 20 mg IM               ED Course as of 02/11/25 0351 Tue Feb 11, 2025   0209 Fetal heart tones 143 beats per minute [DD]      ED Course User Index  [DD] Bk Rod MD          Patient Vitals for the past 24 hrs:   BP Temp Pulse Resp SpO2 Height Weight   02/11/25 0330 121/74 -- 62 -- 98 % -- --   02/11/25 0300 (!) 105/54 -- 64 -- 100 % -- --   02/11/25 0238 (!) 109/40 -- 61 -- 100 % -- --   02/11/25 0130 (!) 100/54 98.4 °F (36.9 °C) (!) 58 20 100 % 5' 9" (1.753 m) (!) 172.4 kg (380 lb)        The patient is resting comfortably and in no acute distress.  She states that her symptoms have improved after treatment in Emergency Department. I personally discussed her test results and treatment plan.  Gave strict ED precautions.  Specific conditions for return to the emergency department and importance of follow up with her primary care provided or the physician listed on the discharge instructions.  Patient voices understanding and agrees to the plan discussed. All patients' questions have been answered at this time.   She has remained hemodynamically stable throughout entire stay in ED and is stable for discharge home.              Clinical " Impression:  Final diagnoses:  [R10.13] Epigastric pain (Primary)  [R14.1] Abdominal gas pain          ED Disposition Condition    Discharge Stable          ED Prescriptions    None       Follow-up Information       Follow up With Specialties Details Why Contact Info    OBGYN   As needed              Bk Rod MD  02/11/25 6872

## 2025-02-11 NOTE — ED NOTES
D/c instructions reviewed with pt. Pt v/u. Pt in no acute distress. Pt calling family for ride home.

## 2025-04-21 LAB — STREP B PCR (OHS): POSITIVE

## 2025-04-26 ENCOUNTER — HOSPITAL ENCOUNTER (EMERGENCY)
Facility: HOSPITAL | Age: 25
Discharge: HOME | End: 2025-04-26
Payer: MEDICAID

## 2025-04-26 VITALS
DIASTOLIC BLOOD PRESSURE: 65 MMHG | RESPIRATION RATE: 20 BRPM | SYSTOLIC BLOOD PRESSURE: 133 MMHG | TEMPERATURE: 98 F | OXYGEN SATURATION: 99 % | HEART RATE: 94 BPM

## 2025-04-26 DIAGNOSIS — M54.9 BACK PAIN AFFECTING PREGNANCY IN FIRST TRIMESTER: Primary | ICD-10-CM

## 2025-04-26 DIAGNOSIS — O99.891 BACK PAIN AFFECTING PREGNANCY IN FIRST TRIMESTER: Primary | ICD-10-CM

## 2025-04-26 DIAGNOSIS — O26.899 PELVIC PAIN IN PREGNANCY: ICD-10-CM

## 2025-04-26 DIAGNOSIS — R10.2 PELVIC PAIN IN PREGNANCY: ICD-10-CM

## 2025-04-26 PROCEDURE — 99284 EMERGENCY DEPT VISIT MOD MDM: CPT

## 2025-04-27 NOTE — ED PROVIDER NOTES
Encounter Date: 2025       History     Chief Complaint   Patient presents with    Back Pain     Started about 2.5hrs ago with vaginal/rectal pressure and abd tightness q7m. -LOF, -VB, +FM     Lucianvalery eLvy is a 25 y.o.  female with IUP at 36w4d gestation who complaining of vaginal/pelvic pressure and back pain. She reports that the back pain and pain in her vagina and rectal are constant. She does report pain in her abdomen which she reports comes and goes.     This IUP is complicated by sti's earlier in pregnancy with negative . Patient reports contractions, denies vaginal bleeding, denies LOF.   Fetal Movement: normal.    The history is provided by the patient.     Review of patient's allergies indicates:  No Known Allergies  Past Medical History:   Diagnosis Date    Anemia, unspecified     GERD (gastroesophageal reflux disease)      Past Surgical History:   Procedure Laterality Date    WISDOM TOOTH EXTRACTION       No family history on file.  Social History[1]  Review of Systems   Constitutional:  Negative for activity change and fatigue.   HENT:  Negative for congestion, sneezing and sore throat.    Respiratory:  Negative for shortness of breath.    Cardiovascular:  Negative for chest pain.   Gastrointestinal:  Positive for abdominal pain. Negative for nausea and vomiting.   Genitourinary:  Positive for pelvic pain. Negative for flank pain, menstrual problem and vaginal discharge.   Musculoskeletal:  Negative for back pain.   Skin:  Negative for rash.   Neurological:  Negative for headaches.       Physical Exam     Initial Vitals   BP Pulse Resp Temp SpO2   25   133/65 104 20 98.2 °F (36.8 °C) 99 %      MAP       --                Physical Exam    Nursing note and vitals reviewed.  Constitutional: She appears well-developed and well-nourished. She is not diaphoretic. No distress.   HENT:   Head: Normocephalic and atraumatic.    Eyes: Conjunctivae and EOM are normal.   Neck:   Normal range of motion.  Cardiovascular:  Normal rate.           Pulmonary/Chest: No respiratory distress.   Musculoskeletal:         General: Normal range of motion.      Cervical back: Normal range of motion.     Neurological: She is alert and oriented to person, place, and time.   Skin: Skin is warm and dry.   Psychiatric: She has a normal mood and affect.     OB LABOR EXAM:   Pre-Term Labor: No.   Membranes ruptured: No.         Dilatation: 1.   Station: -3.   Effacement: 50%.       Comments: NST Interpretation:   145 BPM baseline  Variability: moderate  Accelerations: present  Decelerations: absent  Contractions: none  Clinical Impression: Reactive Non-Stress Test         ED Course   Procedures  Labs Reviewed - No data to display       Imaging Results    None          Medications - No data to display  Medical Decision Making  Problems Addressed:  Back pain affecting pregnancy in first trimester:     Details: - Discusses Tylenol, heating pads and warm bathes  Pelvic pain in pregnancy:     Details: - Encourage patient to wear for maternity belt  -   Patient Instructions:   - Pt was given routine pregnancy instructions including to return to triage if she has any vaginal bleeding, any loss of fluid like her water broke, decreased fetal movement, or contractions Q 5min lasting for 2 or more hours.     Pt was also instructed to drink copious amounts of water. Patient voiced understanding of all these instructions and was subsequently discharged home. Tylenol use and maternity belt use discussed. All questions answered. Pt left [facility] with good understanding of plan.   Preeclampsia/ROM/labor/fever/decreased FM with C precautions discussed, voiced understanding      She will follow up with her primary OB as scheduled          Clinical Impression:  Final diagnoses:  [O99.891, M54.9] Back pain affecting pregnancy in first trimester (Primary)  [O26.899, R10.2] Pelvic  pain in pregnancy          ED Disposition Condition    Discharge Good          ED Prescriptions    None       Follow-up Information    None            [1]   Social History  Tobacco Use    Smoking status: Never    Smokeless tobacco: Never        Rayshawn Gomez MD  04/26/25 2056

## 2025-04-27 NOTE — DISCHARGE INSTRUCTIONS
Please follow up with all scheduled provider(s) visits. Please call your provider(s)office next business day to inquire if a sooner appointment is requested. Return to [facility] with complaints of water/fluid leaking from the vagina, vaginal bleeding, decreased fetal movement,  4 or more painful contractions in an hour, or a persistent headache that does not resolve with normal measures (rest, tylenol, hydration). Perform kick counts when needed and drink 6-10L of water a day.

## 2025-05-02 ENCOUNTER — HOSPITAL ENCOUNTER (EMERGENCY)
Facility: HOSPITAL | Age: 25
Discharge: HOME | End: 2025-05-02
Attending: STUDENT IN AN ORGANIZED HEALTH CARE EDUCATION/TRAINING PROGRAM
Payer: MEDICAID

## 2025-05-02 VITALS
TEMPERATURE: 98 F | OXYGEN SATURATION: 98 % | RESPIRATION RATE: 18 BRPM | BODY MASS INDEX: 41.95 KG/M2 | DIASTOLIC BLOOD PRESSURE: 79 MMHG | SYSTOLIC BLOOD PRESSURE: 137 MMHG | HEART RATE: 106 BPM | HEIGHT: 70 IN | WEIGHT: 293 LBS

## 2025-05-02 PROCEDURE — 99284 EMERGENCY DEPT VISIT MOD MDM: CPT | Mod: 25

## 2025-05-02 NOTE — ED PROVIDER NOTES
"[facility] NOTE     Admit Date: 2025  [facility] Physician: Ryder Torres  Primary OBGYN: Dr. Jared Kemp    Admit Diagnosis/Chief Complaint: Pelvic Pain abdominal pain radiating into the vagina      Chief Complaint   Patient presents with    vaginal pressure     Iup 37.3 weeks reporting constant vaginal pressure since 1510 today. Denies LOF, ctx, and positive fetal movement       Hospital Course:  Jina Levy is a 25 y.o.  at 37w3d presents complaining of constant abdominal pain radiating into the vagina.      Patient states has been complicated by MO in  this pregnancy.     Patient denies vaginal bleeding, leakage of fluid, contractions, headache, vision changes, RUQ pain, dysuria, fever, and nausea/vomiting.  Fetal Movement: normal.    /79   Pulse 106   Temp 98.4 °F (36.9 °C)   Resp 18   Ht 5' 10" (1.778 m)   Wt (!) 180.5 kg (398 lb)   LMP 2024   SpO2 98%   Breastfeeding No   BMI 57.11 kg/m²   Temp:  [98.4 °F (36.9 °C)] 98.4 °F (36.9 °C)  Pulse:  [106-112] 106  Resp:  [18] 18  SpO2:  [98 %] 98 %  BP: (137)/(79) 137/79    General: in no apparent distress well developed and well nourished non-toxic in no respiratory distress and acyanotic alert oriented times 3 afebrile normal vitals  Abdominal: obese FHT present  Back: lumbar tenderness absent    PSIS tenderness negative   CVA tenderness none  Extremeties no redness or tenderness in the calves or thighs no edema    SSE:   SVE:SVE (PeriWATCH)  Dilation (cm): 1  Effacement (%): 50  Station: -3  Examined by:: MARY Mar RN  Chaperone in room: HORACE Deal RN  Simplified Gonzalez Score: 2     FHT:  Pt's habitus difficult to monitor  TOCO: Contractions irritability      LABS:   No results found for this or any previous visit (from the past 24 hours).  [unfilled]     Imaging Results    None          ASSESMENT: Jina Levy is a 25 y.o.   at 37w3d with round ligament pain of pregnancy  Observation in [facility]  Will get BPP to " ensure fetal well being and DC home      Labor precautions reviewed with patient  ER precautions reviewed with patient  Patient was given an opportunity to ask questions  Patient is to follow-up with her primary care physician        Discharge Diagnosis/Clinical Impression**: Round ligament pain of pregnancy  Status:Stable    Patient Instructions:       - Pt was given routine pregnancy instructions including to return to triage if she had any vaginal bleeding (other than spotting for the next 48hrs), any loss of fluid like her water broke, decreased fetal movement, or contractions Q 5min lasting for 2 or more hours. Pt was also instructed to drink copious water. Patient voiced understanding of all these instructions and was subsequently discharged home.    She will follow up with her primary OB.      This note was created with the assistance of [employer] voice recognition software. There may be transcription errors as a result of using this technology however minimal. Effort has been made to assure accuracy of transcription but any obvious errors or omissions should be clarified with the author of the document.

## 2025-05-12 ENCOUNTER — PATIENT MESSAGE (OUTPATIENT)
Dept: OBSTETRICS AND GYNECOLOGY | Facility: HOSPITAL | Age: 25
End: 2025-05-12
Payer: MEDICAID

## 2025-05-13 ENCOUNTER — ANESTHESIA EVENT (OUTPATIENT)
Dept: OBSTETRICS AND GYNECOLOGY | Facility: HOSPITAL | Age: 25
End: 2025-05-13
Payer: MEDICAID

## 2025-05-13 ENCOUNTER — HOSPITAL ENCOUNTER (INPATIENT)
Facility: HOSPITAL | Age: 25
LOS: 2 days | Discharge: HOME | End: 2025-05-15
Attending: OBSTETRICS & GYNECOLOGY | Admitting: OBSTETRICS & GYNECOLOGY
Payer: MEDICAID

## 2025-05-13 ENCOUNTER — ANESTHESIA (OUTPATIENT)
Dept: OBSTETRICS AND GYNECOLOGY | Facility: HOSPITAL | Age: 25
End: 2025-05-13
Payer: MEDICAID

## 2025-05-13 DIAGNOSIS — Z34.90 PREGNANCY: ICD-10-CM

## 2025-05-13 DIAGNOSIS — Z34.90 ENCOUNTER FOR INDUCTION OF LABOR: Primary | ICD-10-CM

## 2025-05-13 LAB
BASOPHILS # BLD AUTO: 0.03 X10(3)/MCL
BASOPHILS NFR BLD AUTO: 0.3 %
EOSINOPHIL # BLD AUTO: 0.26 X10(3)/MCL (ref 0–0.9)
EOSINOPHIL NFR BLD AUTO: 2.5 %
ERYTHROCYTE [DISTWIDTH] IN BLOOD BY AUTOMATED COUNT: 16.3 % (ref 11.5–17)
GROUP & RH: NORMAL
HCT VFR BLD AUTO: 32.3 % (ref 37–47)
HGB BLD-MCNC: 9.8 G/DL (ref 12–16)
IMM GRANULOCYTES # BLD AUTO: 0.2 X10(3)/MCL (ref 0–0.04)
IMM GRANULOCYTES NFR BLD AUTO: 1.9 %
INDIRECT COOMBS: NORMAL
LYMPHOCYTES # BLD AUTO: 2.12 X10(3)/MCL (ref 0.6–4.6)
LYMPHOCYTES NFR BLD AUTO: 20.4 %
MCH RBC QN AUTO: 20.9 PG (ref 27–31)
MCHC RBC AUTO-ENTMCNC: 30.3 G/DL (ref 33–36)
MCV RBC AUTO: 68.9 FL (ref 80–94)
MONOCYTES # BLD AUTO: 0.81 X10(3)/MCL (ref 0.1–1.3)
MONOCYTES NFR BLD AUTO: 7.8 %
NEUTROPHILS # BLD AUTO: 6.99 X10(3)/MCL (ref 2.1–9.2)
NEUTROPHILS NFR BLD AUTO: 67.1 %
NRBC BLD AUTO-RTO: 0 %
PLATELET # BLD AUTO: 327 X10(3)/MCL (ref 130–400)
PMV BLD AUTO: 10.8 FL (ref 7.4–10.4)
RBC # BLD AUTO: 4.69 X10(6)/MCL (ref 4.2–5.4)
SPECIMEN OUTDATE: NORMAL
T PALLIDUM AB SER QL: NONREACTIVE
WBC # BLD AUTO: 10.41 X10(3)/MCL (ref 4.5–11.5)

## 2025-05-13 PROCEDURE — 27000181 HC CABLE, IUPC

## 2025-05-13 PROCEDURE — 62326 NJX INTERLAMINAR LMBR/SAC: CPT | Performed by: NURSE ANESTHETIST, CERTIFIED REGISTERED

## 2025-05-13 PROCEDURE — 51702 INSERT TEMP BLADDER CATH: CPT

## 2025-05-13 PROCEDURE — 25000003 PHARM REV CODE 250: Performed by: OBSTETRICS & GYNECOLOGY

## 2025-05-13 PROCEDURE — 72200005 HC VAGINAL DELIVERY LEVEL II

## 2025-05-13 PROCEDURE — 10907ZC DRAINAGE OF AMNIOTIC FLUID, THERAPEUTIC FROM PRODUCTS OF CONCEPTION, VIA NATURAL OR ARTIFICIAL OPENING: ICD-10-PCS | Performed by: OBSTETRICS & GYNECOLOGY

## 2025-05-13 PROCEDURE — 72100002 HC LABOR CARE, 1ST 8 HOURS

## 2025-05-13 PROCEDURE — 63600175 PHARM REV CODE 636 W HCPCS: Performed by: OBSTETRICS & GYNECOLOGY

## 2025-05-13 PROCEDURE — 3E0P7VZ INTRODUCTION OF HORMONE INTO FEMALE REPRODUCTIVE, VIA NATURAL OR ARTIFICIAL OPENING: ICD-10-PCS | Performed by: OBSTETRICS & GYNECOLOGY

## 2025-05-13 PROCEDURE — 11000001 HC ACUTE MED/SURG PRIVATE ROOM

## 2025-05-13 PROCEDURE — 86780 TREPONEMA PALLIDUM: CPT | Performed by: OBSTETRICS & GYNECOLOGY

## 2025-05-13 PROCEDURE — 86901 BLOOD TYPING SEROLOGIC RH(D): CPT | Performed by: OBSTETRICS & GYNECOLOGY

## 2025-05-13 PROCEDURE — 72100003 HC LABOR CARE, EA. ADDL. 8 HRS

## 2025-05-13 PROCEDURE — 63600175 PHARM REV CODE 636 W HCPCS: Performed by: NURSE ANESTHETIST, CERTIFIED REGISTERED

## 2025-05-13 PROCEDURE — 85025 COMPLETE CBC W/AUTO DIFF WBC: CPT | Performed by: OBSTETRICS & GYNECOLOGY

## 2025-05-13 RX ORDER — EPHEDRINE SULFATE 50 MG/ML
10 INJECTION, SOLUTION INTRAVENOUS ONCE AS NEEDED
OUTPATIENT
Start: 2025-05-13 | End: 2036-10-08

## 2025-05-13 RX ORDER — OXYTOCIN-SODIUM CHLORIDE 0.9% IV SOLN 30 UNIT/500ML 30-0.9/5 UT/ML-%
95 SOLUTION INTRAVENOUS ONCE AS NEEDED
Status: DISCONTINUED | OUTPATIENT
Start: 2025-05-13 | End: 2025-05-15 | Stop reason: HOSPADM

## 2025-05-13 RX ORDER — IBUPROFEN 600 MG/1
600 TABLET, FILM COATED ORAL EVERY 6 HOURS
Status: DISCONTINUED | OUTPATIENT
Start: 2025-05-13 | End: 2025-05-15 | Stop reason: HOSPADM

## 2025-05-13 RX ORDER — DIPHENHYDRAMINE HYDROCHLORIDE 50 MG/ML
25 INJECTION, SOLUTION INTRAMUSCULAR; INTRAVENOUS EVERY 4 HOURS PRN
Status: DISCONTINUED | OUTPATIENT
Start: 2025-05-13 | End: 2025-05-15 | Stop reason: HOSPADM

## 2025-05-13 RX ORDER — CARBOPROST TROMETHAMINE 250 UG/ML
250 INJECTION, SOLUTION INTRAMUSCULAR
Status: DISCONTINUED | OUTPATIENT
Start: 2025-05-13 | End: 2025-05-13 | Stop reason: SDUPTHER

## 2025-05-13 RX ORDER — BUPIVACAINE HYDROCHLORIDE 2.5 MG/ML
INJECTION, SOLUTION EPIDURAL; INFILTRATION; INTRACAUDAL; PERINEURAL
Status: DISCONTINUED | OUTPATIENT
Start: 2025-05-13 | End: 2025-05-13

## 2025-05-13 RX ORDER — OXYTOCIN-SODIUM CHLORIDE 0.9% IV SOLN 30 UNIT/500ML 30-0.9/5 UT/ML-%
SOLUTION INTRAVENOUS
Status: DISPENSED
Start: 2025-05-13 | End: 2025-05-13

## 2025-05-13 RX ORDER — OXYCODONE AND ACETAMINOPHEN 10; 325 MG/1; MG/1
1 TABLET ORAL EVERY 4 HOURS PRN
Status: DISCONTINUED | OUTPATIENT
Start: 2025-05-13 | End: 2025-05-15 | Stop reason: HOSPADM

## 2025-05-13 RX ORDER — DIPHENOXYLATE HYDROCHLORIDE AND ATROPINE SULFATE 2.5; .025 MG/1; MG/1
2 TABLET ORAL EVERY 6 HOURS PRN
Status: DISCONTINUED | OUTPATIENT
Start: 2025-05-13 | End: 2025-05-13 | Stop reason: SDUPTHER

## 2025-05-13 RX ORDER — CALCIUM CARBONATE 200(500)MG
500 TABLET,CHEWABLE ORAL 3 TIMES DAILY PRN
Status: DISCONTINUED | OUTPATIENT
Start: 2025-05-13 | End: 2025-05-15 | Stop reason: HOSPADM

## 2025-05-13 RX ORDER — OXYTOCIN 10 [USP'U]/ML
10 INJECTION, SOLUTION INTRAMUSCULAR; INTRAVENOUS ONCE AS NEEDED
Status: DISCONTINUED | OUTPATIENT
Start: 2025-05-13 | End: 2025-05-15 | Stop reason: HOSPADM

## 2025-05-13 RX ORDER — OXYTOCIN-SODIUM CHLORIDE 0.9% IV SOLN 30 UNIT/500ML 30-0.9/5 UT/ML-%
10 SOLUTION INTRAVENOUS ONCE AS NEEDED
Status: COMPLETED | OUTPATIENT
Start: 2025-05-13 | End: 2025-05-13

## 2025-05-13 RX ORDER — OXYCODONE AND ACETAMINOPHEN 5; 325 MG/1; MG/1
1 TABLET ORAL EVERY 4 HOURS PRN
Status: DISCONTINUED | OUTPATIENT
Start: 2025-05-13 | End: 2025-05-15 | Stop reason: HOSPADM

## 2025-05-13 RX ORDER — MISOPROSTOL 100 UG/1
800 TABLET ORAL ONCE AS NEEDED
Status: DISCONTINUED | OUTPATIENT
Start: 2025-05-13 | End: 2025-05-15 | Stop reason: HOSPADM

## 2025-05-13 RX ORDER — CARBOPROST TROMETHAMINE 250 UG/ML
250 INJECTION, SOLUTION INTRAMUSCULAR
Status: DISCONTINUED | OUTPATIENT
Start: 2025-05-13 | End: 2025-05-15 | Stop reason: HOSPADM

## 2025-05-13 RX ORDER — OXYTOCIN-SODIUM CHLORIDE 0.9% IV SOLN 30 UNIT/500ML 30-0.9/5 UT/ML-%
95 SOLUTION INTRAVENOUS CONTINUOUS PRN
Status: DISCONTINUED | OUTPATIENT
Start: 2025-05-13 | End: 2025-05-13 | Stop reason: SDUPTHER

## 2025-05-13 RX ORDER — BUTORPHANOL TARTRATE 2 MG/ML
2 INJECTION INTRAMUSCULAR; INTRAVENOUS
Status: DISCONTINUED | OUTPATIENT
Start: 2025-05-13 | End: 2025-05-15 | Stop reason: HOSPADM

## 2025-05-13 RX ORDER — ROPIVACAINE HYDROCHLORIDE 2 MG/ML
INJECTION, SOLUTION EPIDURAL; INFILTRATION CONTINUOUS PRN
Status: DISCONTINUED | OUTPATIENT
Start: 2025-05-13 | End: 2025-05-13

## 2025-05-13 RX ORDER — DIPHENHYDRAMINE HYDROCHLORIDE 50 MG/ML
12.5 INJECTION, SOLUTION INTRAMUSCULAR; INTRAVENOUS EVERY 4 HOURS PRN
OUTPATIENT
Start: 2025-05-13

## 2025-05-13 RX ORDER — SIMETHICONE 80 MG
1 TABLET,CHEWABLE ORAL 4 TIMES DAILY PRN
Status: DISCONTINUED | OUTPATIENT
Start: 2025-05-13 | End: 2025-05-13 | Stop reason: SDUPTHER

## 2025-05-13 RX ORDER — DIPHENHYDRAMINE HCL 25 MG
25 CAPSULE ORAL EVERY 4 HOURS PRN
Status: DISCONTINUED | OUTPATIENT
Start: 2025-05-13 | End: 2025-05-15 | Stop reason: HOSPADM

## 2025-05-13 RX ORDER — SIMETHICONE 80 MG
1 TABLET,CHEWABLE ORAL EVERY 6 HOURS PRN
Status: DISCONTINUED | OUTPATIENT
Start: 2025-05-13 | End: 2025-05-15 | Stop reason: HOSPADM

## 2025-05-13 RX ORDER — OXYTOCIN-SODIUM CHLORIDE 0.9% IV SOLN 30 UNIT/500ML 30-0.9/5 UT/ML-%
10 SOLUTION INTRAVENOUS ONCE AS NEEDED
Status: DISCONTINUED | OUTPATIENT
Start: 2025-05-13 | End: 2025-05-15 | Stop reason: HOSPADM

## 2025-05-13 RX ORDER — HYDROCORTISONE 25 MG/G
CREAM TOPICAL 3 TIMES DAILY PRN
Status: DISCONTINUED | OUTPATIENT
Start: 2025-05-13 | End: 2025-05-15 | Stop reason: HOSPADM

## 2025-05-13 RX ORDER — PENICILLIN G 3000000 [IU]/50ML
3 INJECTION, SOLUTION INTRAVENOUS
Status: DISCONTINUED | OUTPATIENT
Start: 2025-05-13 | End: 2025-05-13

## 2025-05-13 RX ORDER — LIDOCAINE HYDROCHLORIDE 10 MG/ML
10 INJECTION, SOLUTION INFILTRATION; PERINEURAL ONCE AS NEEDED
Status: DISCONTINUED | OUTPATIENT
Start: 2025-05-13 | End: 2025-05-13

## 2025-05-13 RX ORDER — MISOPROSTOL 100 UG/1
800 TABLET ORAL ONCE AS NEEDED
Status: DISCONTINUED | OUTPATIENT
Start: 2025-05-13 | End: 2025-05-13 | Stop reason: SDUPTHER

## 2025-05-13 RX ORDER — SODIUM CHLORIDE 0.9 % (FLUSH) 0.9 %
10 SYRINGE (ML) INJECTION
Status: DISCONTINUED | OUTPATIENT
Start: 2025-05-13 | End: 2025-05-15 | Stop reason: HOSPADM

## 2025-05-13 RX ORDER — PRENATAL WITH FERROUS FUM AND FOLIC ACID 3080; 920; 120; 400; 22; 1.84; 3; 20; 10; 1; 12; 200; 27; 25; 2 [IU]/1; [IU]/1; MG/1; [IU]/1; MG/1; MG/1; MG/1; MG/1; MG/1; MG/1; UG/1; MG/1; MG/1; MG/1; MG/1
1 TABLET ORAL DAILY
Status: DISCONTINUED | OUTPATIENT
Start: 2025-05-13 | End: 2025-05-15 | Stop reason: HOSPADM

## 2025-05-13 RX ORDER — OXYTOCIN-SODIUM CHLORIDE 0.9% IV SOLN 30 UNIT/500ML 30-0.9/5 UT/ML-%
10 SOLUTION INTRAVENOUS ONCE AS NEEDED
Status: DISCONTINUED | OUTPATIENT
Start: 2025-05-13 | End: 2025-05-13 | Stop reason: SDUPTHER

## 2025-05-13 RX ORDER — FAMOTIDINE 10 MG/ML
20 INJECTION, SOLUTION INTRAVENOUS ONCE
OUTPATIENT
Start: 2025-05-13 | End: 2025-05-13

## 2025-05-13 RX ORDER — MUPIROCIN 20 MG/G
OINTMENT TOPICAL
Status: DISCONTINUED | OUTPATIENT
Start: 2025-05-13 | End: 2025-05-13

## 2025-05-13 RX ORDER — METHYLERGONOVINE MALEATE 0.2 MG/ML
200 INJECTION INTRAVENOUS ONCE AS NEEDED
Status: DISCONTINUED | OUTPATIENT
Start: 2025-05-13 | End: 2025-05-15 | Stop reason: HOSPADM

## 2025-05-13 RX ORDER — DOCUSATE SODIUM 100 MG/1
200 CAPSULE, LIQUID FILLED ORAL 2 TIMES DAILY PRN
Status: DISCONTINUED | OUTPATIENT
Start: 2025-05-13 | End: 2025-05-15 | Stop reason: HOSPADM

## 2025-05-13 RX ORDER — ROPIVACAINE HYDROCHLORIDE 2 MG/ML
10 INJECTION, SOLUTION EPIDURAL; INFILTRATION CONTINUOUS
Status: DISCONTINUED | OUTPATIENT
Start: 2025-05-13 | End: 2025-05-13

## 2025-05-13 RX ORDER — OXYTOCIN 10 [USP'U]/ML
10 INJECTION, SOLUTION INTRAMUSCULAR; INTRAVENOUS ONCE AS NEEDED
Status: DISCONTINUED | OUTPATIENT
Start: 2025-05-13 | End: 2025-05-13 | Stop reason: SDUPTHER

## 2025-05-13 RX ORDER — ONDANSETRON HYDROCHLORIDE 2 MG/ML
4 INJECTION, SOLUTION INTRAVENOUS ONCE
OUTPATIENT
Start: 2025-05-13 | End: 2025-05-13

## 2025-05-13 RX ORDER — ONDANSETRON 4 MG/1
8 TABLET, ORALLY DISINTEGRATING ORAL EVERY 8 HOURS PRN
Status: DISCONTINUED | OUTPATIENT
Start: 2025-05-13 | End: 2025-05-15 | Stop reason: HOSPADM

## 2025-05-13 RX ORDER — OXYTOCIN-SODIUM CHLORIDE 0.9% IV SOLN 30 UNIT/500ML 30-0.9/5 UT/ML-%
95 SOLUTION INTRAVENOUS CONTINUOUS PRN
Status: DISCONTINUED | OUTPATIENT
Start: 2025-05-13 | End: 2025-05-15 | Stop reason: HOSPADM

## 2025-05-13 RX ORDER — DIPHENOXYLATE HYDROCHLORIDE AND ATROPINE SULFATE 2.5; .025 MG/1; MG/1
2 TABLET ORAL EVERY 6 HOURS PRN
Status: DISCONTINUED | OUTPATIENT
Start: 2025-05-13 | End: 2025-05-15 | Stop reason: HOSPADM

## 2025-05-13 RX ORDER — OXYTOCIN-SODIUM CHLORIDE 0.9% IV SOLN 30 UNIT/500ML 30-0.9/5 UT/ML-%
95 SOLUTION INTRAVENOUS ONCE AS NEEDED
Status: DISCONTINUED | OUTPATIENT
Start: 2025-05-13 | End: 2025-05-13 | Stop reason: SDUPTHER

## 2025-05-13 RX ORDER — METHYLERGONOVINE MALEATE 0.2 MG/ML
200 INJECTION INTRAVENOUS ONCE AS NEEDED
Status: DISCONTINUED | OUTPATIENT
Start: 2025-05-13 | End: 2025-05-13 | Stop reason: SDUPTHER

## 2025-05-13 RX ORDER — TRANEXAMIC ACID 1 G/10ML
INJECTION, SOLUTION INTRAVENOUS
Status: DISPENSED
Start: 2025-05-13 | End: 2025-05-13

## 2025-05-13 RX ORDER — ACETAMINOPHEN 325 MG/1
650 TABLET ORAL EVERY 6 HOURS SCHEDULED
Status: DISCONTINUED | OUTPATIENT
Start: 2025-05-13 | End: 2025-05-15 | Stop reason: HOSPADM

## 2025-05-13 RX ORDER — SODIUM CHLORIDE, SODIUM LACTATE, POTASSIUM CHLORIDE, CALCIUM CHLORIDE 600; 310; 30; 20 MG/100ML; MG/100ML; MG/100ML; MG/100ML
INJECTION, SOLUTION INTRAVENOUS CONTINUOUS
Status: DISCONTINUED | OUTPATIENT
Start: 2025-05-13 | End: 2025-05-13

## 2025-05-13 RX ORDER — ONDANSETRON 4 MG/1
8 TABLET, ORALLY DISINTEGRATING ORAL EVERY 8 HOURS PRN
Status: DISCONTINUED | OUTPATIENT
Start: 2025-05-13 | End: 2025-05-13 | Stop reason: SDUPTHER

## 2025-05-13 RX ORDER — OXYCODONE HYDROCHLORIDE 5 MG/1
10 TABLET ORAL EVERY 6 HOURS PRN
Status: DISCONTINUED | OUTPATIENT
Start: 2025-05-13 | End: 2025-05-15 | Stop reason: HOSPADM

## 2025-05-13 RX ADMIN — TRANEXAMIC ACID 1000 MG: 100 INJECTION INTRAVENOUS at 11:05

## 2025-05-13 RX ADMIN — SODIUM CHLORIDE, POTASSIUM CHLORIDE, SODIUM LACTATE AND CALCIUM CHLORIDE: 600; 310; 30; 20 INJECTION, SOLUTION INTRAVENOUS at 05:05

## 2025-05-13 RX ADMIN — DINOPROSTONE 10 MG: 10 INSERT VAGINAL at 01:05

## 2025-05-13 RX ADMIN — SODIUM CHLORIDE 5 MILLION UNITS: 900 INJECTION INTRAVENOUS at 05:05

## 2025-05-13 RX ADMIN — SODIUM CHLORIDE, POTASSIUM CHLORIDE, SODIUM LACTATE AND CALCIUM CHLORIDE 1000 ML: 600; 310; 30; 20 INJECTION, SOLUTION INTRAVENOUS at 05:05

## 2025-05-13 RX ADMIN — PENICILLIN G 3 MILLION UNITS: 3000000 INJECTION, SOLUTION INTRAVENOUS at 09:05

## 2025-05-13 RX ADMIN — OXYCODONE AND ACETAMINOPHEN 1 TABLET: 325; 10 TABLET ORAL at 03:05

## 2025-05-13 RX ADMIN — DOCUSATE SODIUM 200 MG: 100 CAPSULE, LIQUID FILLED ORAL at 08:05

## 2025-05-13 RX ADMIN — OXYTOCIN-SODIUM CHLORIDE 0.9% IV SOLN 30 UNIT/500ML 10 UNITS: 30-0.9/5 SOLUTION at 11:05

## 2025-05-13 RX ADMIN — BUPIVACAINE HYDROCHLORIDE 10 ML: 2.5 INJECTION, SOLUTION EPIDURAL; INFILTRATION; INTRACAUDAL; PERINEURAL at 05:05

## 2025-05-13 RX ADMIN — IBUPROFEN 600 MG: 600 TABLET ORAL at 06:05

## 2025-05-13 RX ADMIN — IBUPROFEN 600 MG: 600 TABLET ORAL at 12:05

## 2025-05-13 RX ADMIN — ROPIVACAINE HYDROCHLORIDE 10 ML/HR: 2 INJECTION, SOLUTION EPIDURAL; INFILTRATION at 05:05

## 2025-05-13 RX ADMIN — SIMETHICONE 80 MG: 80 TABLET, CHEWABLE ORAL at 08:05

## 2025-05-13 RX ADMIN — IBUPROFEN 600 MG: 600 TABLET ORAL at 11:05

## 2025-05-13 NOTE — ANESTHESIA PREPROCEDURE EVALUATION
05/13/2025  Jina Levy is a 25 y.o., female.      Pre-op Assessment    I have reviewed the Patient Summary Reports.     I have reviewed the Nursing Notes. I have reviewed the NPO Status.   I have reviewed the Medications.     Review of Systems  Anesthesia Hx:  No problems with previous Anesthesia                Social:  Non-Smoker       Hematology/Oncology:       -- Anemia:                                  Cardiovascular:       Denies Valvular problems/Murmurs.                                         Pulmonary:     Denies Asthma.                    Renal/:   Denies Chronic Renal Disease.                Hepatic/GI:     GERD         Gerd          Neurological:       Denies Seizures.                                Endocrine:  Denies Diabetes. Denies Hypothyroidism.  Denies Hyperthyroidism.       Denies Obesity / BMI > 30      Physical Exam  General: Well nourished, Cooperative, Alert and Oriented    Airway:  Mallampati: II   Mouth Opening: Normal  TM Distance: Normal  Neck ROM: Normal ROM    Dental:  Intact        Anesthesia Plan  Type of Anesthesia, risks & benefits discussed:    Anesthesia Type: Epidural  Intra-op Monitoring Plan: Standard ASA Monitors  Post Op Pain Control Plan: multimodal analgesia, epidural analgesia and PCA  Informed Consent: Informed consent signed with the Patient and all parties understand the risks and agree with anesthesia plan.  All questions answered.   ASA Score: 3  Day of Surgery Review of History & Physical: H&P Update referred to the surgeon/provider.    Ready For Surgery From Anesthesia Perspective.     .

## 2025-05-13 NOTE — ANESTHESIA POSTPROCEDURE EVALUATION
Anesthesia Post Evaluation    Patient: Jian Levy    Procedure(s) Performed: * No procedures listed *    Final Anesthesia Type: epidural      Patient location during evaluation: labor & delivery  Patient participation: Yes- Able to Participate  Level of consciousness: awake and alert  Post-procedure vital signs: reviewed and stable  Pain management: adequate  Airway patency: patent    PONV status at discharge: No PONV  Anesthetic complications: no      Cardiovascular status: blood pressure returned to baseline  Respiratory status: unassisted and spontaneous ventilation  Hydration status: euvolemic  Follow-up needed               Vitals Value Taken Time   /63 05/13/25 13:33   Temp 36.8 °C (98.2 °F) 05/13/25 11:43   Pulse 115 05/13/25 13:33   Resp 20 05/13/25 11:43   SpO2 100 % 05/13/25 11:36   Vitals shown include unfiled device data.      No case tracking events are documented in the log.      Pain/Sarthak Score: Pain Rating Prior to Med Admin: 7 (5/13/2025 12:35 PM)

## 2025-05-13 NOTE — L&D DELIVERY NOTE
"[facility]  Vaginal Delivery   Operative Note    SUMMARY     Normal spontaneous vaginal delivery of live infant, was placed on mothers abdomen for skin to skin and bulb suctioning performed.  Infant delivered position OA over intact perineum.  Nuchal cord: Yes, cord reduced at perineum.    Spontaneous delivery of placenta and IV pitocin given noting good uterine tone.  No lacerations noted.  Patient tolerated delivery well. Sponge needle and lap counted correctly x2.    Indications: <principal problem not specified>  Pregnancy complicated by: Problem List[1]  Admitting GA: 39w0d    Delivery Information for Perla Levy    Birth information:  YOB: 2025   Time of birth: 11:29 AM   Sex: female   Head Delivery Date/Time: 2025 11:28 AM   Delivery type: Vaginal, Spontaneous   Gestational Age: 39w0d       Delivery Providers    Delivering clinician: Jared Kemp MD   Provider Role    Holley Harley RN Delivery Nurse    Merly Nash RN Registered Nurse    Denisse Lundy RN Registered Nurse              Measurements    Weight: 3402 g  Weight (lbs): 7 lb 8 oz  Length: 50.8 cm  Length (in): 20"  Head circumference: 35.6 cm         Apgars    Living status: Living  Apgar Component Scores:  1 min.:  5 min.:  10 min.:  15 min.:  20 min.:    Skin color:  1  1       Heart rate:  2  2       Reflex irritability:  2  2       Muscle tone:  2  2       Respiratory effort:  2  2       Total:  9  9       Apgars assigned by: JANET         Operative Delivery    Forceps attempted?: No  Vacuum extractor attempted?: No         Shoulder Dystocia    Shoulder dystocia present?: No           Presentation    Presentation: Vertex  Position: Occiput Anterior           Interventions/Resuscitation    Method: Bulb Suctioning       Cord    Vessels: 3 vessels  Complications: Nuchal  Nuchal Cord Description: tight nuchal cord  Number of Loops: 2  Delayed Cord Clamping?: No  Cord Blood " Disposition: Sent with Baby  Gases Sent?: No  Cord Comments: Delivered with nuchal  Stem Cell Collection (by MD): No       Placenta    Placenta delivery date/time: 2025 11:31  Placenta removal: Spontaneous  Placenta appearance: Intact  Placenta disposition: Discarded           Labor Events:       labor: No     Labor Onset Date/Time: 2025 01:18     Dilation Complete Date/Time: 2025 11:22     Start Pushing Date/Time: 2025 11:23     Rupture Date/Time: 25 0737        Rupture type: ARM (Artificial Rupture)        Fluid Amount:       Fluid Color: Clear      steroids: None     Antibiotics given for GBS: Yes     Induction: dinoprostone insert     Indications for induction:  Elective     Augmentation: amniotomy     Indications for augmentation: Ineffective Contraction Pattern     Labor complications: None     Additional complications:          Cervical ripenin2025 1:18 AM      Cervidil          Delivery:      Episiotomy: None     Indication for Episiotomy:       Perineal Lacerations: None Repaired:      Periurethral Laceration:   Repaired:     Labial Laceration:   Repaired:     Sulcus Laceration:   Repaired:     Vaginal Laceration:   Repaired:     Cervical Laceration:   Repaired:     Repair suture: None     Repair # of packets:       Last Value - EBL - Nursing (mL):       Sum - EBL - Nursing (mL): 0     Last Value - EBL - Anesthesia (mL):      Calculated QBL (mL): 200     Running total QBL (mL): 200     Vaginal Sweep Performed: Yes     Surgicount Correct: Yes       Other providers:       Anesthesia    Method: Epidural          Details (if applicable):  Trial of Labor      Categorization:      Priority:     Indications for :     Incision Type:       Additional  information:  Forceps:    Vacuum:    Breech:    Observed anomalies    Other (Comments):              [1] There is no problem list on file for this patient.

## 2025-05-13 NOTE — ANESTHESIA PROCEDURE NOTES
Epidural    Patient location during procedure: OB   Reason for block: primary anesthetic   Reason for block: labor analgesia requested by patient and obstetrician  Diagnosis: Labor Pain   Start time: 5/13/2025 5:27 AM  Timeout: 5/13/2025 5:25 AM  End time: 5/13/2025 5:35 AM  Surgery related to: Active Labor    Staffing  Performing Provider: Reno Calvillo CRNA  Authorizing Provider: Varun Anand DO    Staffing  Performed by: Reno Calvillo CRNA  Authorized by: Varun Anand DO        Preanesthetic Checklist  Completed: patient identified, IV checked, site marked, risks and benefits discussed, surgical consent, monitors and equipment checked, pre-op evaluation, timeout performed, anesthesia consent given, hand hygiene performed and patient being monitored  Preparation  Patient position: sitting  Prep: ChloraPrep  Patient monitoring: Pulse Ox and Blood Pressure  Reason for block: primary anesthetic   Epidural  Skin Anesthetic: lidocaine 1%  Skin Wheal: 3 mL  Administration type: continuous  Approach: midline  Interspace: L3-4    Injection technique: STEPHANIE saline  Needle and Epidural Catheter  Needle type: Tuohy   Needle gauge: 17  Needle length: 7.0 inches  Needle insertion depth: 5 cm  Catheter type: multi-orifice  Catheter size: 18 G  Catheter at skin depth: 12 cm  Insertion Attempts: 1  Test dose: 3 mL of lidocaine 1.5% with Epi 1-to-200,000  Additional Documentation: incremental injection, no paresthesia on injection, no significant pain on injection, negative aspiration for heme and CSF and no significant complaints from patient  Needle localization: anatomical landmarks  Assessment  Upper dermatomal levels - Left: T10  Right: T10   Dermatomal levels determined by alcohol wipe  Ease of block: easy  Patient's tolerance of the procedure: comfortable throughout block and no complaints  Additional Notes  States comfortable No inadvertent dural puncture with Tuohy.  Dural puncture not performed with spinal  needle

## 2025-05-13 NOTE — H&P
[facility] - Labor and Delivery  Obstetrics  History & Physical    Patient Name: Jina Levy  MRN: [id number]  Admission Date: 2025  Primary Care Provider: Violet, Primary Doctor    Subjective:     Principal Problem:iol     History of Present Illness: elective full term iol    Obstetric HPI:  Patient reports None contractions, active fetal movement, No vaginal bleeding , No loss of fluid     This pregnancy has been complicated by obesity    OB History    Para Term  AB Living   2 1 1 0 0 1   SAB IAB Ectopic Multiple Live Births   0 0 0 0 1      # Outcome Date GA Lbr Roberth/2nd Weight Sex Type Anes PTL Lv   2 Current            1 Term 10/08/20     Vag-Spont   CYRUS     Past Medical History:   Diagnosis Date    Anemia, unspecified     GERD (gastroesophageal reflux disease)      Past Surgical History:   Procedure Laterality Date    WISDOM TOOTH EXTRACTION         PTA Medications   Medication Sig    aspirin (ECOTRIN) 81 MG EC tablet Take 1 tablet (81 mg total) by mouth once daily.    famotidine (PEPCID) 20 MG tablet Take 1 tablet (20 mg total) by mouth 2 (two) times daily. for 10 days    ferrous sulfate 325 (65 FE) MG EC tablet Take 1 tablet (325 mg total) by mouth once daily.    hyoscyamine (ANASPAZ,LEVSIN) 0.125 mg Tab Take 1 tablet (125 mcg total) by mouth every 4 (four) hours as needed.    ondansetron (ZOFRAN) 4 MG tablet Take 1 tablet (4 mg total) by mouth every 6 (six) hours.    PNV,calcium 72-iron,carb-folic (PRENATAL PLUS) 29 mg iron- 1 mg Tab Take 1 tablet by mouth once daily.    promethazine (PHENERGAN) 12.5 MG Tab Take 1 tablet (12.5 mg total) by mouth 2 (two) times a day.       Review of patient's allergies indicates:  No Known Allergies     Family History    None       Tobacco Use    Smoking status: Never    Smokeless tobacco: Never   Substance and Sexual Activity    Alcohol use: Not Currently    Drug use: Not Currently     Types: Marijuana    Sexual activity: Yes     Review of  Systems   Objective:     Vital Signs (Most Recent):    Vital Signs (24h Range):  BP: (124)/(67) 124/67        There is no height or weight on file to calculate BMI.    FHT: 145Cat 1 (reassuring)  TOCO:  Q  minutes    Physical Exam    Cervix:  Dilation:  1  Effacement:  50%  Station: -3  Presentation: Vertex     Significant Labs:  Lab Results   Component Value Date    GROUPTRH B POS 2025    HEPBSAG Nonreactive 2025       I have personallly reviewed all pertinent lab results from the last 24 hours.    Assessment/Plan:     25 y.o. female  at 39w0d for:    There are no hospital problems to display for this patient.      Cervidil   Monitoring pain management    Jared Kemp MD  Obstetrics  [facility] - Labor and Delivery

## 2025-05-14 PROCEDURE — 90471 IMMUNIZATION ADMIN: CPT | Performed by: OBSTETRICS & GYNECOLOGY

## 2025-05-14 PROCEDURE — 3E0234Z INTRODUCTION OF SERUM, TOXOID AND VACCINE INTO MUSCLE, PERCUTANEOUS APPROACH: ICD-10-PCS | Performed by: OBSTETRICS & GYNECOLOGY

## 2025-05-14 PROCEDURE — 25000003 PHARM REV CODE 250: Performed by: OBSTETRICS & GYNECOLOGY

## 2025-05-14 PROCEDURE — 90715 TDAP VACCINE 7 YRS/> IM: CPT | Performed by: OBSTETRICS & GYNECOLOGY

## 2025-05-14 PROCEDURE — 11000001 HC ACUTE MED/SURG PRIVATE ROOM

## 2025-05-14 PROCEDURE — 25000003 PHARM REV CODE 250

## 2025-05-14 PROCEDURE — 63600175 PHARM REV CODE 636 W HCPCS: Performed by: OBSTETRICS & GYNECOLOGY

## 2025-05-14 RX ORDER — TRAZODONE HYDROCHLORIDE 50 MG/1
50 TABLET ORAL NIGHTLY PRN
Status: DISCONTINUED | OUTPATIENT
Start: 2025-05-14 | End: 2025-05-15 | Stop reason: HOSPADM

## 2025-05-14 RX ORDER — ESCITALOPRAM OXALATE 10 MG/1
10 TABLET ORAL DAILY
Status: DISCONTINUED | OUTPATIENT
Start: 2025-05-14 | End: 2025-05-15 | Stop reason: HOSPADM

## 2025-05-14 RX ADMIN — CLOSTRIDIUM TETANI TOXOID ANTIGEN (FORMALDEHYDE INACTIVATED), CORYNEBACTERIUM DIPHTHERIAE TOXOID ANTIGEN (FORMALDEHYDE INACTIVATED), BORDETELLA PERTUSSIS TOXOID ANTIGEN (GLUTARALDEHYDE INACTIVATED), BORDETELLA PERTUSSIS FILAMENTOUS HEMAGGLUTININ ANTIGEN (FORMALDEHYDE INACTIVATED), BORDETELLA PERTUSSIS PERTACTIN ANTIGEN, AND BORDETELLA PERTUSSIS FIMBRIAE 2/3 ANTIGEN 0.5 ML: 5; 2; 2.5; 5; 3; 5 INJECTION, SUSPENSION INTRAMUSCULAR at 05:05

## 2025-05-14 RX ADMIN — IBUPROFEN 600 MG: 600 TABLET ORAL at 12:05

## 2025-05-14 RX ADMIN — IBUPROFEN 600 MG: 600 TABLET ORAL at 11:05

## 2025-05-14 RX ADMIN — IBUPROFEN 600 MG: 600 TABLET ORAL at 05:05

## 2025-05-14 RX ADMIN — ESCITALOPRAM OXALATE 10 MG: 10 TABLET ORAL at 05:05

## 2025-05-14 RX ADMIN — OXYCODONE AND ACETAMINOPHEN 1 TABLET: 325; 10 TABLET ORAL at 02:05

## 2025-05-14 RX ADMIN — DOCUSATE SODIUM 200 MG: 100 CAPSULE, LIQUID FILLED ORAL at 09:05

## 2025-05-14 NOTE — PROGRESS NOTES
Pt seen and examined.  Doing well.  Ambulating and voiding.  Tolerating regular diet.  Pain controlled with meds. Baby doing well in room.     Olumiant Pregnancy And Lactation Text: Based on animal studies, Olumiant may cause embryo-fetal harm when administered to pregnant women.  The medication should not be used in pregnancy.  Breastfeeding is not recommended during treatment.

## 2025-05-14 NOTE — CONSULTS
5/14/2025  Jina Levy   2000   [id number]            Psychiatry Initial Consult Note     Date of Admission: 5/13/2025 12:08 AM    Chief Complaint: Psychiatric consult for depression. [place] score of 14.    SUBJECTIVE:   History of Present Illness:   Jina Levy is a 25 y.o. female with a past medical history that includes anemia and GERD who had a vaginal delivery of her second child at 39w0d gestation on 05/13/25. She reports depressed mood starting during her pregnancy in which she would feel sad and down for for most of a day 2-3 times per week. However, approximately two months ago depression became worse and has been a daily and constant occurrence. She endorses impaired sleep since onset of pregnancy. Also feeling so sad that her appetite has decreased. Has had to force herself to eat leading to nausea and vomiting. Decreased energy, loss of motivation to do normal activities, and a loss of enjoyment in normal activities present. Denies suicidal ideations. Endorses feelings of guilt during pregnancy related to having a second child and not feeling she was in a good spot in life for this event. She denies frequent worry or anxiety before pregnancy but has felt more anxious during pregnancy. Worried is not routing but is worse at night. Has also had new onset of racing thoughts, feeling tense, on edge, and restless, as well as feeling increased irritability over the last two months. Denies history of auditory/visual hallucinations or paranoia and no evidence of psychosis at this time. Also denies a history of episodes consistent with ian/hypomania and no family psychiatric history reported.         Past Psychiatric History:   Previous Psychiatric Hospitalizations: Denies   Previous Medication Trials: None  Previous Suicide Attempts: Denies   Outpatient psychiatrist: None    Current Medications:   Home Psychiatric Meds: None    Past Medical/Surgical History:   Past Medical History:    Diagnosis Date    Anemia, unspecified     GERD (gastroesophageal reflux disease)      Past Surgical History:   Procedure Laterality Date    WISDOM TOOTH EXTRACTION           Family Psychiatric History:   Denies     Allergies:   Review of patient's allergies indicates:  No Known Allergies    Substance Abuse History:   Tobacco: Denies  Alcohol: Denies  Illicit Substances: Denies. History of marijuana use  Treatment: Denies        Scheduled Meds:    acetaminophen  650 mg Oral 4 times per day    ibuprofen  600 mg Oral Q6H    prenatal vitamin  1 tablet Oral Daily      PRN Meds:   Current Facility-Administered Medications:     benzocaine-lanolin, , Topical (Top), Continuous PRN    butorphanol, 2 mg, Intravenous, Q3H PRN    calcium carbonate, 500 mg, Oral, TID PRN    carboprost, 250 mcg, Intramuscular, Q15 Min PRN    diphenhydrAMINE, 25 mg, Oral, Q4H PRN    diphenhydrAMINE, 25 mg, Intravenous, Q4H PRN    diphenoxylate-atropine 2.5-0.025 mg, 2 tablet, Oral, Q6H PRN    docusate sodium, 200 mg, Oral, BID PRN    hydrocortisone, , Rectal, TID PRN    lanolin, , Topical (Top), PRN    measles, mumps and rubella vaccine, 0.5 mL, Subcutaneous, vaccine x 1 dose    methylergonovine, 200 mcg, Intramuscular, Once PRN    miSOPROStoL, 800 mcg, Rectal, Once PRN    miSOPROStoL, 800 mcg, Oral, Once PRN    ondansetron, 8 mg, Oral, Q8H PRN    oxyCODONE, 10 mg, Oral, Q6H PRN    oxyCODONE-acetaminophen, 1 tablet, Oral, Q4H PRN    oxyCODONE-acetaminophen, 1 tablet, Oral, Q4H PRN    oxytocin, 95 jourdan-units/min, Intravenous, Continuous PRN    oxytocin, 95 jourdan-units/min, Intravenous, Once PRN    oxytocin, 10 Units, Intravenous, Once PRN    oxytocin, 10 Units, Intramuscular, Once PRN    simethicone, 1 tablet, Oral, Q6H PRN    sodium chloride 0.9%, 10 mL, Intravenous, PRN    DIPH,PERTUSS(ACELL),TET VACCINE (ADULT)(BOOSTRIX,ADACEL), 0.5 mL, Intramuscular, vaccine x 1 dose    tranexamic acid (CYKLOKAPRON) infusion, 1,000 mg, Intravenous, Q30 Min  "PRN   Psychotherapeutics (From admission, onward)      None              Social History:  Housing Status: Lives with brother, sister-in-law, niece, and her oldest daughter  Relationship Status/Sexual Orientation: Single   Children: 2  Education: Completed high school   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    history: Denies  History of physical/sexual abuse: Denies   Access to gun: Denies       Legal History:   Past Charges/Incarcerations: Denies   Pending charges: Denies      OBJECTIVE:       Vitals   Vitals:    05/14/25 0810   BP: 134/80   Pulse: 89   Resp: 17   Temp: 98 °F (36.7 °C)        Labs/Imaging/Studies:   Recent Results (from the past 48 hours)   Type & Screen    Collection Time: 05/13/25 12:55 AM   Result Value Ref Range    Group & Rh B POS     Indirect Katrina GEL NEG     Specimen Outdate 05/16/2025 23:59    SYPHILIS ANTIBODY (WITH REFLEX RPR)    Collection Time: 05/13/25 12:55 AM   Result Value Ref Range    Syphilis Antibody Nonreactive Nonreactive, Equivocal   CBC with Differential    Collection Time: 05/13/25 12:55 AM   Result Value Ref Range    WBC 10.41 4.50 - 11.50 x10(3)/mcL    RBC 4.69 4.20 - 5.40 x10(6)/mcL    Hgb 9.8 (L) 12.0 - 16.0 g/dL    Hct 32.3 (L) 37.0 - 47.0 %    MCV 68.9 (L) 80.0 - 94.0 fL    MCH 20.9 (L) 27.0 - 31.0 pg    MCHC 30.3 (L) 33.0 - 36.0 g/dL    RDW 16.3 11.5 - 17.0 %    Platelet 327 130 - 400 x10(3)/mcL    MPV 10.8 (H) 7.4 - 10.4 fL    Neut % 67.1 %    Lymph % 20.4 %    Mono % 7.8 %    Eos % 2.5 %    Basophil % 0.3 %    Imm Grans % 1.9 %    Neut # 6.99 2.1 - 9.2 x10(3)/mcL    Lymph # 2.12 0.6 - 4.6 x10(3)/mcL    Mono # 0.81 0.1 - 1.3 x10(3)/mcL    Eos # 0.26 0 - 0.9 x10(3)/mcL    Baso # 0.03 <=0.2 x10(3)/mcL    Imm Gran # 0.20 (H) 0.00 - 0.04 x10(3)/mcL    NRBC% 0.0 %      No results found for: "PHENYTOIN", "PHENOBARB", "VALPROATE", "CBMZ"        Psychiatric Mental Status Exam:  General Appearance: appears stated " age, dressed in hospital garb, lying in bed, in no acute distress  Arousal: alert  Behavior: normal; cooperative; reasonably friendly, pleasant, and polite; appropriate eye-contact; under good behavioral control  Movements and Motor Activity: no tics, no tremors, no akathisia, no dystonia, no evidence of tardive dyskinesia  Orientation: oriented to person, place, time, and situation  Speech: normal rate, rhythm, volume, tone and pitch  Mood: Depressed  Affect: reactive, mood-congruent, dysthymic  Thought Process: intact, linear, goal-directed, organized, logical  Associations: intact, no loosening of associations  Thought Content and Perceptions: no suicidal or homicidal ideation, no auditory or visual hallucinations, no paranoid ideation, no ideas of reference, no evidence of delusions or psychosis  Recent and Remote Memory: grossly intact; per interview/observation with patient  Attention and Concentration: grossly intact; per interview/observation with patient  Fund of Knowledge: grossly intact; based on history, vocabulary, fund of knowledge, syntax, grammar, and content  Insight: intact; based on understanding of severity of illness and HPI  Judgment: intact; based on patient's behavior and HPI        ASSESSMENT/PLAN:   Diagnoses:  Major Depressive Disorder, Moderate, with peripartum onset, with anxious distress    Past Medical History:   Diagnosis Date    Anemia, unspecified     GERD (gastroesophageal reflux disease)           Problem lists and Management Plans:  Medication management  Lexapro 10mg PO QD  Trazodone 50mg PO QHS PRN insomnia  Medications called into answering machine of [facility] retail pharmacy for meds to beds.  Legal  PEC not recommended. Not at imminent risk of harm to self or others. Not currently gravely disabled due to acute psychiatric condition.  Follow-up  Recommend referrals for outpatient follow-up for therapy and medication management.  Psychiatry will sign-off        Luis Clayton

## 2025-05-14 NOTE — CONSULTS
LMSW consulted to assess for resource needs and make referrals as appropriate due to EPDS score of 14. Mom presented in hospital bed and was agreeable to complete consult at this time with her uncle present. Mom reported living at the address listed below with her parents, FOB and her 5yo daughter. Mom denied a hx of mental health needs or PPD/PPA with her first pregnancy. Mom reported some  depression that has worsened throughout this pregnancy. Mom denied being established with any counselor or psychiatrist. Mom requested a psychiatric consult to initiate medication management at this time. Provided non-medical resources for PPD/PPA including information on [facility] hotline, PMAD information, online and in person support groups, local counselors, affirmations for both parents, supportive language guide and postpartum planning template. Mom reported having reliable transportation for discharge and all needed infant supplies including a car seat and place for safe sleep. Psych consult called in at 112:46pm     Baby name: Chasidy Worthy   FOB: Aaron Worthy   Address: [address] [place] [id number]

## 2025-05-14 NOTE — PLAN OF CARE
Problem: Adult Inpatient Plan of Care  Goal: Plan of Care Review  Outcome: Progressing  Goal: Patient-Specific Goal (Individualized)  Outcome: Progressing  Goal: Absence of Hospital-Acquired Illness or Injury  Outcome: Progressing  Goal: Optimal Comfort and Wellbeing  Outcome: Progressing  Goal: Readiness for Transition of Care  Outcome: Progressing     Problem: Bariatric Environmental Safety  Goal: Safety Maintained with Care  Outcome: Progressing     Problem:  Fall Injury Risk  Goal: Absence of Fall, Infant Drop and Related Injury  Outcome: Progressing     Problem: Infection  Goal: Absence of Infection Signs and Symptoms  Outcome: Progressing     Problem: Postpartum (Vaginal Delivery)  Goal: Successful Parent Role Transition  Outcome: Progressing  Goal: Hemostasis  Outcome: Progressing  Goal: Absence of Infection Signs and Symptoms  Outcome: Progressing  Goal: Anesthesia/Sedation Recovery  Outcome: Progressing  Goal: Optimal Pain Control and Function  Outcome: Progressing  Goal: Effective Urinary Elimination  Outcome: Progressing      100 98

## 2025-05-15 VITALS
BODY MASS INDEX: 41.95 KG/M2 | SYSTOLIC BLOOD PRESSURE: 120 MMHG | TEMPERATURE: 98 F | WEIGHT: 293 LBS | RESPIRATION RATE: 16 BRPM | HEIGHT: 70 IN | OXYGEN SATURATION: 100 % | DIASTOLIC BLOOD PRESSURE: 82 MMHG | HEART RATE: 76 BPM

## 2025-05-15 PROBLEM — Z34.90 PREGNANCY: Status: ACTIVE | Noted: 2025-05-15

## 2025-05-15 PROCEDURE — 25000003 PHARM REV CODE 250

## 2025-05-15 PROCEDURE — 25000003 PHARM REV CODE 250: Performed by: OBSTETRICS & GYNECOLOGY

## 2025-05-15 RX ORDER — IBUPROFEN 600 MG/1
600 TABLET, FILM COATED ORAL EVERY 6 HOURS
Qty: 28 TABLET | Refills: 0 | Status: SHIPPED | OUTPATIENT
Start: 2025-05-15

## 2025-05-15 RX ORDER — ESCITALOPRAM OXALATE 10 MG/1
10 TABLET ORAL DAILY
Qty: 90 TABLET | Refills: 3 | Status: SHIPPED | OUTPATIENT
Start: 2025-05-16 | End: 2026-05-16

## 2025-05-15 RX ADMIN — OXYCODONE AND ACETAMINOPHEN 1 TABLET: 325; 10 TABLET ORAL at 05:05

## 2025-05-15 RX ADMIN — IBUPROFEN 600 MG: 600 TABLET ORAL at 11:05

## 2025-05-15 RX ADMIN — ESCITALOPRAM OXALATE 10 MG: 10 TABLET ORAL at 08:05

## 2025-05-15 RX ADMIN — PRENATAL VITAMINS-IRON FUMARATE 27 MG IRON-FOLIC ACID 0.8 MG TABLET 1 TABLET: at 08:05

## 2025-05-15 RX ADMIN — IBUPROFEN 600 MG: 600 TABLET ORAL at 05:05

## 2025-05-15 NOTE — DISCHARGE SUMMARY
"[facility] - 2nd Floor Mother/Baby Unit  Obstetrics  Discharge Summary      Patient Name: Jina Levy  MRN: [id number]  Admission Date: 2025  Hospital Length of Stay: 2 days  Discharge Date and Time: 05/15/2025 9:34 AM  Attending Physician: James Kemp MD   Discharging Provider: James Kemp MD  Primary Care Provider: Violet, Primary Doctor    HPI:     * No surgery found *     Hospital Course: stable    Consults (From admission, onward)          Status Ordering Provider     Inpatient consult to Psychiatry  Once        Provider:  [facility]    JAMES Brooks     IP consult to case management  Once        Provider:  (Not yet assigned)    JAMES Brooks            Final Active Diagnoses:    Diagnosis Date Noted POA    PRINCIPAL PROBLEM:  Pregnancy [Z34.90] 05/15/2025 Not Applicable      Problems Resolved During this Admission:        Labs: All labs within the past 24 hours have been reviewed    Feeding Method: both breast and bottle    Immunizations       Date Immunization Status Dose Route/Site Given by    25 1249 MMR Incomplete 0.5 mL Subcutaneous/     25 1705 Tdap Deleted 0.5 mL Intramuscular/     25 1703 Tdap Given 0.5 mL Intramuscular/Right deltoid Godfrey Davis RN            Delivery:    Episiotomy: None   Lacerations: None   Repair suture: None   Repair # of packets:     Blood loss (ml):       Birth information:  YOB: 2025   Time of birth: 11:29 AM   Sex: female   Delivery type: Vaginal, Spontaneous   Gestational Age: 39w0d     Measurements    Weight: 3402 g  Weight (lbs): 7 lb 8 oz  Length: 50.8 cm  Length (in): 20"  Head circumference: 35.6 cm         Delivery Clinician: Delivery Providers    Delivering clinician: James Kemp MD   Provider Role    Holley Harley RN Delivery Nurse    Merly Nash RN Charge Nurse    Denisse Lundy RN Registered Nurse             Additional  " information:  Forceps:    Vacuum:    Breech:    Observed anomalies      Living?:     Apgars    Living status: Living  Apgar Component Scores:  1 min.:  5 min.:  10 min.:  15 min.:  20 min.:    Skin color:  1  1       Heart rate:  2  2       Reflex irritability:  2  2       Muscle tone:  2  2       Respiratory effort:  2  2       Total:  9  9       Apgars assigned by: JANET         Placenta: Delivered:       appearance  Pending Diagnostic Studies:       None            Discharged Condition: stable    Disposition: Home or Self Care    Follow Up:    Patient Instructions:   No discharge procedures on file.  Medications:  Current Discharge Medication List        START taking these medications    Details   EScitalopram oxalate (LEXAPRO) 10 MG tablet Take 1 tablet (10 mg total) by mouth once daily.  Qty: 90 tablet, Refills: 3      ibuprofen (ADVIL,MOTRIN) 600 MG tablet Take 1 tablet (600 mg total) by mouth every 6 (six) hours.  Qty: 28 tablet, Refills: 0           CONTINUE these medications which have NOT CHANGED    Details   aspirin (ECOTRIN) 81 MG EC tablet Take 1 tablet (81 mg total) by mouth once daily.  Qty: 30 tablet, Refills: 9    Associated Diagnoses: Encounter for supervision of normal pregnancy in multigravida in first trimester; Encntr for obs for oth suspected diseases and cond ruled out      ferrous sulfate 325 (65 FE) MG EC tablet Take 1 tablet (325 mg total) by mouth once daily.  Qty: 30 tablet, Refills: 3    Associated Diagnoses: Supervision of normal intrauterine pregnancy in multigravida, second trimester      PNV,calcium 72-iron,carb-folic (PRENATAL PLUS) 29 mg iron- 1 mg Tab Take 1 tablet by mouth once daily.  Qty: 30 tablet, Refills: 11    Comments: Okay to change the brand if this one is not available or not covered  Associated Diagnoses: Encounter for supervision of normal pregnancy in multigravida in first trimester; Encntr for obs for oth suspected diseases and cond ruled out      promethazine  (PHENERGAN) 12.5 MG Tab Take 1 tablet (12.5 mg total) by mouth 2 (two) times a day.  Qty: 60 tablet, Refills: 1    Associated Diagnoses: Encounter for supervision of normal pregnancy in multigravida in third trimester      famotidine (PEPCID) 20 MG tablet Take 1 tablet (20 mg total) by mouth 2 (two) times daily. for 10 days  Qty: 20 tablet, Refills: 1    Associated Diagnoses: Encounter for supervision of high risk pregnancy in second trimester, antepartum      hyoscyamine (ANASPAZ,LEVSIN) 0.125 mg Tab Take 1 tablet (125 mcg total) by mouth every 4 (four) hours as needed.  Qty: 20 tablet, Refills: 0      ondansetron (ZOFRAN) 4 MG tablet Take 1 tablet (4 mg total) by mouth every 6 (six) hours.  Qty: 12 tablet, Refills: 0             Jared Kemp MD  Obstetrics  [facility] - 2nd Floor Mother/Baby Unit

## 2025-05-15 NOTE — PLAN OF CARE
Problem: Adult Inpatient Plan of Care  Goal: Plan of Care Review  Outcome: Progressing  Goal: Patient-Specific Goal (Individualized)  Outcome: Progressing  Goal: Absence of Hospital-Acquired Illness or Injury  Outcome: Progressing  Goal: Optimal Comfort and Wellbeing  Outcome: Progressing  Goal: Readiness for Transition of Care  Outcome: Progressing     Problem: Bariatric Environmental Safety  Goal: Safety Maintained with Care  Outcome: Progressing     Problem:  Fall Injury Risk  Goal: Absence of Fall, Infant Drop and Related Injury  Outcome: Progressing     Problem: Postpartum (Vaginal Delivery)  Goal: Successful Parent Role Transition  Outcome: Progressing  Goal: Hemostasis  Outcome: Progressing  Goal: Absence of Infection Signs and Symptoms  Outcome: Progressing  Goal: Anesthesia/Sedation Recovery  Outcome: Progressing  Goal: Optimal Pain Control and Function  Outcome: Progressing  Goal: Effective Urinary Elimination  Outcome: Progressing     Problem: Infection  Goal: Absence of Infection Signs and Symptoms  Outcome: Progressing

## 2025-07-13 ENCOUNTER — HOSPITAL ENCOUNTER (OUTPATIENT)
Facility: HOSPITAL | Age: 25
Discharge: STILL ADMITTED OR ADMINISTRATIVE | End: 2025-07-14
Attending: SPECIALIST | Admitting: FAMILY MEDICINE
Payer: MEDICAID

## 2025-07-13 DIAGNOSIS — E66.01 MORBID OBESITY: ICD-10-CM

## 2025-07-13 DIAGNOSIS — R00.0 TACHYCARDIA: ICD-10-CM

## 2025-07-13 DIAGNOSIS — R74.01 TRANSAMINITIS: ICD-10-CM

## 2025-07-13 DIAGNOSIS — K85.00 IDIOPATHIC ACUTE PANCREATITIS WITHOUT INFECTION OR NECROSIS: Primary | ICD-10-CM

## 2025-07-13 LAB
ALBUMIN SERPL-MCNC: 3.6 G/DL (ref 3.5–5)
ALBUMIN/GLOB SERPL: 0.8 RATIO (ref 1.1–2)
ALP SERPL-CCNC: 221 UNIT/L (ref 40–150)
ALT SERPL-CCNC: 177 UNIT/L (ref 0–55)
ANION GAP SERPL CALC-SCNC: 17 MEQ/L
ANISOCYTOSIS BLD QL SMEAR: SLIGHT
AST SERPL-CCNC: 162 UNIT/L (ref 11–45)
BASOPHILS # BLD AUTO: 0.01 X10(3)/MCL
BASOPHILS NFR BLD AUTO: 0.1 %
BILIRUB SERPL-MCNC: 1.9 MG/DL
BUN SERPL-MCNC: 12.2 MG/DL (ref 7–18.7)
CALCIUM SERPL-MCNC: 9.1 MG/DL (ref 8.4–10.2)
CHLORIDE SERPL-SCNC: 106 MMOL/L (ref 98–107)
CO2 SERPL-SCNC: 21 MMOL/L (ref 22–29)
CREAT SERPL-MCNC: 0.74 MG/DL (ref 0.55–1.02)
CREAT/UREA NIT SERPL: 16
EOSINOPHIL # BLD AUTO: 0 X10(3)/MCL (ref 0–0.9)
EOSINOPHIL NFR BLD AUTO: 0 %
ERYTHROCYTE [DISTWIDTH] IN BLOOD BY AUTOMATED COUNT: 20.7 % (ref 11.5–17)
GFR SERPLBLD CREATININE-BSD FMLA CKD-EPI: >60 ML/MIN/1.73/M2
GLOBULIN SER-MCNC: 4.6 GM/DL (ref 2.4–3.5)
GLUCOSE SERPL-MCNC: 161 MG/DL (ref 74–100)
HCT VFR BLD AUTO: 52.3 % (ref 37–47)
HGB BLD-MCNC: 15.8 G/DL (ref 12–16)
IMM GRANULOCYTES # BLD AUTO: 0.05 X10(3)/MCL (ref 0–0.04)
IMM GRANULOCYTES NFR BLD AUTO: 0.3 %
LIPASE SERPL-CCNC: 1229 U/L
LYMPHOCYTES # BLD AUTO: 1.62 X10(3)/MCL (ref 0.6–4.6)
LYMPHOCYTES NFR BLD AUTO: 11.3 %
MCH RBC QN AUTO: 21.1 PG (ref 27–31)
MCHC RBC AUTO-ENTMCNC: 30.2 G/DL (ref 33–36)
MCV RBC AUTO: 69.7 FL (ref 80–94)
MONOCYTES # BLD AUTO: 0.6 X10(3)/MCL (ref 0.1–1.3)
MONOCYTES NFR BLD AUTO: 4.2 %
NEUTROPHILS # BLD AUTO: 12.1 X10(3)/MCL (ref 2.1–9.2)
NEUTROPHILS NFR BLD AUTO: 84.1 %
PLATELET # BLD AUTO: 434 X10(3)/MCL (ref 130–400)
PLATELET # BLD EST: ADEQUATE 10*3/UL
PMV BLD AUTO: 10 FL (ref 7.4–10.4)
POTASSIUM SERPL-SCNC: 3.4 MMOL/L (ref 3.5–5.1)
PROT SERPL-MCNC: 8.2 GM/DL (ref 6.4–8.3)
RBC # BLD AUTO: 7.5 X10(6)/MCL (ref 4.2–5.4)
RBC MORPH BLD: ABNORMAL
SODIUM SERPL-SCNC: 144 MMOL/L (ref 136–145)
WBC # BLD AUTO: 14.38 X10(3)/MCL (ref 4.5–11.5)

## 2025-07-13 PROCEDURE — 99285 EMERGENCY DEPT VISIT HI MDM: CPT | Mod: 25

## 2025-07-13 PROCEDURE — 96376 TX/PRO/DX INJ SAME DRUG ADON: CPT

## 2025-07-13 PROCEDURE — 83690 ASSAY OF LIPASE: CPT | Performed by: SPECIALIST

## 2025-07-13 PROCEDURE — 85025 COMPLETE CBC W/AUTO DIFF WBC: CPT | Performed by: SPECIALIST

## 2025-07-13 PROCEDURE — 81001 URINALYSIS AUTO W/SCOPE: CPT | Performed by: SPECIALIST

## 2025-07-13 PROCEDURE — 81025 URINE PREGNANCY TEST: CPT | Performed by: SPECIALIST

## 2025-07-13 PROCEDURE — 63600175 PHARM REV CODE 636 W HCPCS: Performed by: SPECIALIST

## 2025-07-13 PROCEDURE — 96375 TX/PRO/DX INJ NEW DRUG ADDON: CPT

## 2025-07-13 PROCEDURE — 96361 HYDRATE IV INFUSION ADD-ON: CPT

## 2025-07-13 PROCEDURE — 96374 THER/PROPH/DIAG INJ IV PUSH: CPT

## 2025-07-13 PROCEDURE — 80053 COMPREHEN METABOLIC PANEL: CPT | Performed by: SPECIALIST

## 2025-07-13 PROCEDURE — 25000003 PHARM REV CODE 250: Performed by: SPECIALIST

## 2025-07-13 RX ORDER — ONDANSETRON HYDROCHLORIDE 2 MG/ML
4 INJECTION, SOLUTION INTRAVENOUS
Status: COMPLETED | OUTPATIENT
Start: 2025-07-13 | End: 2025-07-13

## 2025-07-13 RX ORDER — MORPHINE SULFATE 4 MG/ML
4 INJECTION, SOLUTION INTRAMUSCULAR; INTRAVENOUS
Refills: 0 | Status: COMPLETED | OUTPATIENT
Start: 2025-07-13 | End: 2025-07-13

## 2025-07-13 RX ADMIN — SODIUM CHLORIDE 1000 ML: 9 INJECTION, SOLUTION INTRAVENOUS at 10:07

## 2025-07-13 RX ADMIN — ONDANSETRON 4 MG: 2 INJECTION INTRAMUSCULAR; INTRAVENOUS at 10:07

## 2025-07-13 RX ADMIN — MORPHINE SULFATE 4 MG: 4 INJECTION INTRAVENOUS at 11:07

## 2025-07-13 RX ADMIN — ONDANSETRON 4 MG: 2 INJECTION INTRAMUSCULAR; INTRAVENOUS at 11:07

## 2025-07-14 ENCOUNTER — HOSPITAL ENCOUNTER (INPATIENT)
Facility: HOSPITAL | Age: 25
LOS: 2 days | Discharge: ANOTHER HOSPITAL | DRG: 438 | End: 2025-07-16
Attending: SURGERY | Admitting: SURGERY
Payer: MEDICAID

## 2025-07-14 VITALS
RESPIRATION RATE: 24 BRPM | HEIGHT: 70 IN | BODY MASS INDEX: 41.95 KG/M2 | HEART RATE: 150 BPM | WEIGHT: 293 LBS | OXYGEN SATURATION: 96 % | TEMPERATURE: 99 F | DIASTOLIC BLOOD PRESSURE: 76 MMHG | SYSTOLIC BLOOD PRESSURE: 100 MMHG

## 2025-07-14 DIAGNOSIS — K80.20 CHOLELITHIASIS: ICD-10-CM

## 2025-07-14 DIAGNOSIS — R00.0 TACHYCARDIA: ICD-10-CM

## 2025-07-14 DIAGNOSIS — J18.9 COMMUNITY ACQUIRED PNEUMONIA OF RIGHT MIDDLE LOBE OF LUNG: ICD-10-CM

## 2025-07-14 DIAGNOSIS — K85.90 SEVERE ACUTE PANCREATITIS: Primary | ICD-10-CM

## 2025-07-14 LAB
ALBUMIN SERPL-MCNC: 2.9 G/DL (ref 3.5–5)
ALBUMIN SERPL-MCNC: 3.2 G/DL (ref 3.5–5)
ALBUMIN/GLOB SERPL: 0.8 RATIO (ref 1.1–2)
ALBUMIN/GLOB SERPL: 0.8 RATIO (ref 1.1–2)
ALP SERPL-CCNC: 162 UNIT/L (ref 40–150)
ALP SERPL-CCNC: 224 UNIT/L (ref 40–150)
ALT SERPL-CCNC: 100 UNIT/L (ref 0–55)
ALT SERPL-CCNC: 136 UNIT/L (ref 0–55)
ANION GAP SERPL CALC-SCNC: 15 MEQ/L
ANION GAP SERPL CALC-SCNC: 17 MEQ/L
AST SERPL-CCNC: 70 UNIT/L (ref 11–45)
AST SERPL-CCNC: 81 UNIT/L (ref 11–45)
B-HCG UR QL: NEGATIVE
BACTERIA #/AREA URNS AUTO: ABNORMAL /HPF
BASOPHILS # BLD AUTO: 0 X10(3)/MCL
BASOPHILS # BLD AUTO: 0 X10(3)/MCL
BASOPHILS NFR BLD AUTO: 0 %
BASOPHILS NFR BLD AUTO: 0 %
BILIRUB SERPL-MCNC: 0.9 MG/DL
BILIRUB SERPL-MCNC: 1.6 MG/DL
BILIRUB UR QL STRIP.AUTO: ABNORMAL
BUN SERPL-MCNC: 11.8 MG/DL (ref 7–18.7)
BUN SERPL-MCNC: 21 MG/DL (ref 7–18.7)
CALCIUM SERPL-MCNC: 5.7 MG/DL (ref 8.4–10.2)
CALCIUM SERPL-MCNC: 7.9 MG/DL (ref 8.4–10.2)
CHLORIDE SERPL-SCNC: 101 MMOL/L (ref 98–107)
CHLORIDE SERPL-SCNC: 105 MMOL/L (ref 98–107)
CLARITY UR: CLEAR
CO2 SERPL-SCNC: 16 MMOL/L (ref 22–29)
CO2 SERPL-SCNC: 21 MMOL/L (ref 22–29)
COLOR UR AUTO: YELLOW
CREAT SERPL-MCNC: 0.86 MG/DL (ref 0.55–1.02)
CREAT SERPL-MCNC: 1 MG/DL (ref 0.55–1.02)
CREAT/UREA NIT SERPL: 14
CREAT/UREA NIT SERPL: 21
EOSINOPHIL # BLD AUTO: 0 X10(3)/MCL (ref 0–0.9)
EOSINOPHIL # BLD AUTO: 0 X10(3)/MCL (ref 0–0.9)
EOSINOPHIL NFR BLD AUTO: 0 %
EOSINOPHIL NFR BLD AUTO: 0 %
ERYTHROCYTE [DISTWIDTH] IN BLOOD BY AUTOMATED COUNT: 20.9 % (ref 11.5–17)
ERYTHROCYTE [DISTWIDTH] IN BLOOD BY AUTOMATED COUNT: 21.1 % (ref 11.5–17)
GFR SERPLBLD CREATININE-BSD FMLA CKD-EPI: >60 ML/MIN/1.73/M2
GFR SERPLBLD CREATININE-BSD FMLA CKD-EPI: >60 ML/MIN/1.73/M2
GLOBULIN SER-MCNC: 3.7 GM/DL (ref 2.4–3.5)
GLOBULIN SER-MCNC: 4 GM/DL (ref 2.4–3.5)
GLUCOSE SERPL-MCNC: 183 MG/DL (ref 74–100)
GLUCOSE SERPL-MCNC: 244 MG/DL (ref 74–100)
GLUCOSE UR QL STRIP: NEGATIVE
HCT VFR BLD AUTO: 51 % (ref 37–47)
HCT VFR BLD AUTO: 53.7 % (ref 37–47)
HGB BLD-MCNC: 15.3 G/DL (ref 12–16)
HGB BLD-MCNC: 16.4 G/DL (ref 12–16)
HGB UR QL STRIP: ABNORMAL
IMM GRANULOCYTES # BLD AUTO: 0.03 X10(3)/MCL (ref 0–0.04)
IMM GRANULOCYTES # BLD AUTO: 0.05 X10(3)/MCL (ref 0–0.04)
IMM GRANULOCYTES NFR BLD AUTO: 0.3 %
IMM GRANULOCYTES NFR BLD AUTO: 0.4 %
INR PPP: 1.1
KETONES UR QL STRIP: NEGATIVE
LACTATE SERPL-SCNC: 4.7 MMOL/L (ref 0.5–2.2)
LACTATE SERPL-SCNC: 5.3 MMOL/L (ref 0.5–2.2)
LACTATE SERPL-SCNC: 6.4 MMOL/L (ref 0.5–2.2)
LDH SERPL-CCNC: 459 U/L (ref 125–220)
LEUKOCYTE ESTERASE UR QL STRIP: ABNORMAL
LIPASE SERPL-CCNC: 1171 U/L
LYMPHOCYTES # BLD AUTO: 1.06 X10(3)/MCL (ref 0.6–4.6)
LYMPHOCYTES # BLD AUTO: 1.3 X10(3)/MCL (ref 0.6–4.6)
LYMPHOCYTES NFR BLD AUTO: 10.5 %
LYMPHOCYTES NFR BLD AUTO: 11.5 %
MAGNESIUM SERPL-MCNC: 1.39 MG/DL (ref 1.6–2.6)
MCH RBC QN AUTO: 20.6 PG (ref 27–31)
MCH RBC QN AUTO: 21.3 PG (ref 27–31)
MCHC RBC AUTO-ENTMCNC: 30 G/DL (ref 33–36)
MCHC RBC AUTO-ENTMCNC: 30.5 G/DL (ref 33–36)
MCV RBC AUTO: 68.8 FL (ref 80–94)
MCV RBC AUTO: 69.6 FL (ref 80–94)
MONOCYTES # BLD AUTO: 0.58 X10(3)/MCL (ref 0.1–1.3)
MONOCYTES # BLD AUTO: 1.08 X10(3)/MCL (ref 0.1–1.3)
MONOCYTES NFR BLD AUTO: 6.3 %
MONOCYTES NFR BLD AUTO: 8.7 %
NEUTROPHILS # BLD AUTO: 7.58 X10(3)/MCL (ref 2.1–9.2)
NEUTROPHILS # BLD AUTO: 9.98 X10(3)/MCL (ref 2.1–9.2)
NEUTROPHILS NFR BLD AUTO: 80.4 %
NEUTROPHILS NFR BLD AUTO: 81.9 %
NITRITE UR QL STRIP: POSITIVE
PH UR STRIP: 6 [PH]
PHOSPHATE SERPL-MCNC: 3 MG/DL (ref 2.3–4.7)
PLATELET # BLD AUTO: 281 X10(3)/MCL (ref 130–400)
PLATELET # BLD AUTO: 358 X10(3)/MCL (ref 130–400)
PMV BLD AUTO: 10.2 FL (ref 7.4–10.4)
PMV BLD AUTO: 9.8 FL (ref 7.4–10.4)
POTASSIUM SERPL-SCNC: 5.1 MMOL/L (ref 3.5–5.1)
POTASSIUM SERPL-SCNC: 5.3 MMOL/L (ref 3.5–5.1)
PROT SERPL-MCNC: 6.6 GM/DL (ref 6.4–8.3)
PROT SERPL-MCNC: 7.2 GM/DL (ref 6.4–8.3)
PROT UR QL STRIP: 100
PROTHROMBIN TIME: 14.4 SECONDS (ref 12.4–14.9)
RBC # BLD AUTO: 7.41 X10(6)/MCL (ref 4.2–5.4)
RBC # BLD AUTO: 7.71 X10(6)/MCL (ref 4.2–5.4)
RBC #/AREA URNS AUTO: ABNORMAL /HPF
SODIUM SERPL-SCNC: 134 MMOL/L (ref 136–145)
SODIUM SERPL-SCNC: 141 MMOL/L (ref 136–145)
SP GR UR STRIP.AUTO: 1.02 (ref 1–1.03)
SQUAMOUS #/AREA URNS AUTO: ABNORMAL /HPF
TRIGL SERPL-MCNC: 110 MG/DL (ref 37–140)
UROBILINOGEN UR STRIP-ACNC: 0.2
WBC # BLD AUTO: 12.41 X10(3)/MCL (ref 4.5–11.5)
WBC # BLD AUTO: 9.25 X10(3)/MCL (ref 4.5–11.5)
WBC #/AREA URNS AUTO: ABNORMAL /HPF

## 2025-07-14 PROCEDURE — 83735 ASSAY OF MAGNESIUM: CPT | Performed by: SURGERY

## 2025-07-14 PROCEDURE — 96376 TX/PRO/DX INJ SAME DRUG ADON: CPT

## 2025-07-14 PROCEDURE — 25500020 PHARM REV CODE 255: Performed by: SURGERY

## 2025-07-14 PROCEDURE — 63600175 PHARM REV CODE 636 W HCPCS: Performed by: FAMILY MEDICINE

## 2025-07-14 PROCEDURE — 36415 COLL VENOUS BLD VENIPUNCTURE: CPT | Performed by: SURGERY

## 2025-07-14 PROCEDURE — 99900031 HC PATIENT EDUCATION (STAT)

## 2025-07-14 PROCEDURE — 83690 ASSAY OF LIPASE: CPT | Performed by: FAMILY MEDICINE

## 2025-07-14 PROCEDURE — 80053 COMPREHEN METABOLIC PANEL: CPT | Performed by: FAMILY MEDICINE

## 2025-07-14 PROCEDURE — G0378 HOSPITAL OBSERVATION PER HR: HCPCS

## 2025-07-14 PROCEDURE — 99900035 HC TECH TIME PER 15 MIN (STAT)

## 2025-07-14 PROCEDURE — 83615 LACTATE (LD) (LDH) ENZYME: CPT | Performed by: SPECIALIST

## 2025-07-14 PROCEDURE — 86900 BLOOD TYPING SEROLOGIC ABO: CPT | Performed by: SURGERY

## 2025-07-14 PROCEDURE — 93010 ELECTROCARDIOGRAM REPORT: CPT | Mod: 76,,, | Performed by: INTERNAL MEDICINE

## 2025-07-14 PROCEDURE — 93010 ELECTROCARDIOGRAM REPORT: CPT | Mod: ,,, | Performed by: INTERNAL MEDICINE

## 2025-07-14 PROCEDURE — 36415 COLL VENOUS BLD VENIPUNCTURE: CPT | Performed by: FAMILY MEDICINE

## 2025-07-14 PROCEDURE — 93005 ELECTROCARDIOGRAM TRACING: CPT

## 2025-07-14 PROCEDURE — 96361 HYDRATE IV INFUSION ADD-ON: CPT

## 2025-07-14 PROCEDURE — 85025 COMPLETE CBC W/AUTO DIFF WBC: CPT | Performed by: SURGERY

## 2025-07-14 PROCEDURE — 96375 TX/PRO/DX INJ NEW DRUG ADDON: CPT

## 2025-07-14 PROCEDURE — 84478 ASSAY OF TRIGLYCERIDES: CPT | Performed by: SPECIALIST

## 2025-07-14 PROCEDURE — 25500020 PHARM REV CODE 255: Performed by: SPECIALIST

## 2025-07-14 PROCEDURE — 25000003 PHARM REV CODE 250: Performed by: SPECIALIST

## 2025-07-14 PROCEDURE — 85025 COMPLETE CBC W/AUTO DIFF WBC: CPT | Performed by: FAMILY MEDICINE

## 2025-07-14 PROCEDURE — 93041 RHYTHM ECG TRACING: CPT

## 2025-07-14 PROCEDURE — 11000001 HC ACUTE MED/SURG PRIVATE ROOM

## 2025-07-14 PROCEDURE — 84100 ASSAY OF PHOSPHORUS: CPT | Performed by: SURGERY

## 2025-07-14 PROCEDURE — 94799 UNLISTED PULMONARY SVC/PX: CPT

## 2025-07-14 PROCEDURE — 83605 ASSAY OF LACTIC ACID: CPT | Mod: 91 | Performed by: FAMILY MEDICINE

## 2025-07-14 PROCEDURE — 63600175 PHARM REV CODE 636 W HCPCS: Performed by: SPECIALIST

## 2025-07-14 PROCEDURE — 94760 N-INVAS EAR/PLS OXIMETRY 1: CPT

## 2025-07-14 PROCEDURE — 25000003 PHARM REV CODE 250: Performed by: FAMILY MEDICINE

## 2025-07-14 PROCEDURE — 83605 ASSAY OF LACTIC ACID: CPT | Performed by: SURGERY

## 2025-07-14 PROCEDURE — 85610 PROTHROMBIN TIME: CPT | Performed by: SURGERY

## 2025-07-14 PROCEDURE — 80053 COMPREHEN METABOLIC PANEL: CPT | Performed by: SURGERY

## 2025-07-14 RX ORDER — METOPROLOL TARTRATE 1 MG/ML
5 INJECTION, SOLUTION INTRAVENOUS EVERY 4 HOURS PRN
Status: DISCONTINUED | OUTPATIENT
Start: 2025-07-14 | End: 2025-07-14 | Stop reason: HOSPADM

## 2025-07-14 RX ORDER — PROCHLORPERAZINE EDISYLATE 5 MG/ML
5 INJECTION INTRAMUSCULAR; INTRAVENOUS EVERY 6 HOURS PRN
Status: DISCONTINUED | OUTPATIENT
Start: 2025-07-14 | End: 2025-07-14 | Stop reason: HOSPADM

## 2025-07-14 RX ORDER — PANTOPRAZOLE SODIUM 40 MG/10ML
40 INJECTION, POWDER, LYOPHILIZED, FOR SOLUTION INTRAVENOUS DAILY
Status: DISCONTINUED | OUTPATIENT
Start: 2025-07-14 | End: 2025-07-14 | Stop reason: HOSPADM

## 2025-07-14 RX ORDER — IOPAMIDOL 755 MG/ML
100 INJECTION, SOLUTION INTRAVASCULAR
Status: COMPLETED | OUTPATIENT
Start: 2025-07-14 | End: 2025-07-14

## 2025-07-14 RX ORDER — TALC
6 POWDER (GRAM) TOPICAL NIGHTLY PRN
Status: DISCONTINUED | OUTPATIENT
Start: 2025-07-14 | End: 2025-07-14 | Stop reason: HOSPADM

## 2025-07-14 RX ORDER — HYDROMORPHONE HYDROCHLORIDE 2 MG/ML
2 INJECTION, SOLUTION INTRAMUSCULAR; INTRAVENOUS; SUBCUTANEOUS EVERY 4 HOURS PRN
Status: DISCONTINUED | OUTPATIENT
Start: 2025-07-14 | End: 2025-07-14 | Stop reason: HOSPADM

## 2025-07-14 RX ORDER — HYDROMORPHONE HYDROCHLORIDE 2 MG/ML
1 INJECTION, SOLUTION INTRAMUSCULAR; INTRAVENOUS; SUBCUTANEOUS
Refills: 0 | Status: COMPLETED | OUTPATIENT
Start: 2025-07-14 | End: 2025-07-14

## 2025-07-14 RX ORDER — FAMOTIDINE 20 MG/1
20 TABLET, FILM COATED ORAL 2 TIMES DAILY
Status: DISCONTINUED | OUTPATIENT
Start: 2025-07-14 | End: 2025-07-14 | Stop reason: HOSPADM

## 2025-07-14 RX ORDER — LABETALOL HCL 20 MG/4 ML
10 SYRINGE (ML) INTRAVENOUS EVERY 4 HOURS PRN
Status: DISCONTINUED | OUTPATIENT
Start: 2025-07-14 | End: 2025-07-14 | Stop reason: HOSPADM

## 2025-07-14 RX ORDER — SODIUM CHLORIDE 450 MG/100ML
INJECTION, SOLUTION INTRAVENOUS CONTINUOUS
Status: DISCONTINUED | OUTPATIENT
Start: 2025-07-14 | End: 2025-07-14 | Stop reason: HOSPADM

## 2025-07-14 RX ORDER — AMOXICILLIN 250 MG
1 CAPSULE ORAL 2 TIMES DAILY
Status: DISCONTINUED | OUTPATIENT
Start: 2025-07-14 | End: 2025-07-14 | Stop reason: HOSPADM

## 2025-07-14 RX ORDER — ACETAMINOPHEN 325 MG/1
650 TABLET ORAL EVERY 8 HOURS PRN
Status: DISCONTINUED | OUTPATIENT
Start: 2025-07-14 | End: 2025-07-14 | Stop reason: HOSPADM

## 2025-07-14 RX ORDER — CEFTRIAXONE 1 G/1
1 INJECTION, POWDER, FOR SOLUTION INTRAMUSCULAR; INTRAVENOUS
Status: DISCONTINUED | OUTPATIENT
Start: 2025-07-14 | End: 2025-07-14

## 2025-07-14 RX ORDER — POLYETHYLENE GLYCOL 3350 17 G/17G
17 POWDER, FOR SOLUTION ORAL DAILY
Status: DISCONTINUED | OUTPATIENT
Start: 2025-07-14 | End: 2025-07-14 | Stop reason: HOSPADM

## 2025-07-14 RX ORDER — HYDROMORPHONE HYDROCHLORIDE 2 MG/ML
0.5 INJECTION, SOLUTION INTRAMUSCULAR; INTRAVENOUS; SUBCUTANEOUS EVERY 4 HOURS PRN
Refills: 0 | Status: DISCONTINUED | OUTPATIENT
Start: 2025-07-14 | End: 2025-07-14

## 2025-07-14 RX ORDER — HYDROMORPHONE HYDROCHLORIDE 2 MG/ML
2 INJECTION, SOLUTION INTRAMUSCULAR; INTRAVENOUS; SUBCUTANEOUS EVERY 4 HOURS PRN
Status: DISCONTINUED | OUTPATIENT
Start: 2025-07-14 | End: 2025-07-14

## 2025-07-14 RX ORDER — CEFTRIAXONE 1 G/1
2 INJECTION, POWDER, FOR SOLUTION INTRAMUSCULAR; INTRAVENOUS
Status: DISCONTINUED | OUTPATIENT
Start: 2025-07-15 | End: 2025-07-15

## 2025-07-14 RX ORDER — MORPHINE SULFATE 4 MG/ML
4 INJECTION, SOLUTION INTRAMUSCULAR; INTRAVENOUS
Refills: 0 | Status: COMPLETED | OUTPATIENT
Start: 2025-07-14 | End: 2025-07-14

## 2025-07-14 RX ORDER — SODIUM CHLORIDE, SODIUM LACTATE, POTASSIUM CHLORIDE, CALCIUM CHLORIDE 600; 310; 30; 20 MG/100ML; MG/100ML; MG/100ML; MG/100ML
1000 INJECTION, SOLUTION INTRAVENOUS CONTINUOUS
Status: DISCONTINUED | OUTPATIENT
Start: 2025-07-14 | End: 2025-07-14

## 2025-07-14 RX ORDER — LOPERAMIDE HYDROCHLORIDE 2 MG/1
2 CAPSULE ORAL
Status: DISCONTINUED | OUTPATIENT
Start: 2025-07-14 | End: 2025-07-14 | Stop reason: HOSPADM

## 2025-07-14 RX ORDER — HYDROMORPHONE HYDROCHLORIDE 2 MG/ML
1 INJECTION, SOLUTION INTRAMUSCULAR; INTRAVENOUS; SUBCUTANEOUS EVERY 4 HOURS PRN
Refills: 0 | Status: DISCONTINUED | OUTPATIENT
Start: 2025-07-14 | End: 2025-07-14

## 2025-07-14 RX ORDER — MORPHINE SULFATE 4 MG/ML
4 INJECTION, SOLUTION INTRAMUSCULAR; INTRAVENOUS EVERY 4 HOURS PRN
Status: DISCONTINUED | OUTPATIENT
Start: 2025-07-14 | End: 2025-07-14 | Stop reason: HOSPADM

## 2025-07-14 RX ORDER — SODIUM CHLORIDE 0.9 % (FLUSH) 0.9 %
2 SYRINGE (ML) INJECTION EVERY 6 HOURS PRN
Status: DISCONTINUED | OUTPATIENT
Start: 2025-07-14 | End: 2025-07-14 | Stop reason: HOSPADM

## 2025-07-14 RX ORDER — ONDANSETRON HYDROCHLORIDE 2 MG/ML
4 INJECTION, SOLUTION INTRAVENOUS EVERY 8 HOURS PRN
Status: DISCONTINUED | OUTPATIENT
Start: 2025-07-14 | End: 2025-07-14 | Stop reason: HOSPADM

## 2025-07-14 RX ORDER — MORPHINE SULFATE 4 MG/ML
4 INJECTION, SOLUTION INTRAMUSCULAR; INTRAVENOUS EVERY 4 HOURS PRN
Refills: 0 | Status: DISCONTINUED | OUTPATIENT
Start: 2025-07-14 | End: 2025-07-14

## 2025-07-14 RX ORDER — NALOXONE HCL 0.4 MG/ML
0.4 VIAL (ML) INJECTION ONCE
Status: COMPLETED | OUTPATIENT
Start: 2025-07-15 | End: 2025-07-14

## 2025-07-14 RX ADMIN — SODIUM CHLORIDE 1000 ML: 9 INJECTION, SOLUTION INTRAVENOUS at 12:07

## 2025-07-14 RX ADMIN — POLYETHYLENE GLYCOL 3350 17 G: 17 POWDER, FOR SOLUTION ORAL at 09:07

## 2025-07-14 RX ADMIN — DOCUSATE SODIUM 50MG AND SENNOSIDES 8.6MG 1 TABLET: 8.6; 5 TABLET, FILM COATED ORAL at 09:07

## 2025-07-14 RX ADMIN — IOPAMIDOL 100 ML: 755 INJECTION, SOLUTION INTRAVENOUS at 11:07

## 2025-07-14 RX ADMIN — METOPROLOL TARTRATE 5 MG: 1 INJECTION, SOLUTION INTRAVENOUS at 04:07

## 2025-07-14 RX ADMIN — MORPHINE SULFATE 4 MG: 4 INJECTION, SOLUTION INTRAMUSCULAR; INTRAVENOUS at 01:07

## 2025-07-14 RX ADMIN — SODIUM CHLORIDE 1000 ML: 9 INJECTION, SOLUTION INTRAVENOUS at 11:07

## 2025-07-14 RX ADMIN — NALOXONE HYDROCHLORIDE 0.4 MG: 0.4 INJECTION, SOLUTION INTRAMUSCULAR; INTRAVENOUS; SUBCUTANEOUS at 11:07

## 2025-07-14 RX ADMIN — CEFTRIAXONE SODIUM 1 G: 1 INJECTION, POWDER, FOR SOLUTION INTRAMUSCULAR; INTRAVENOUS at 04:07

## 2025-07-14 RX ADMIN — IOHEXOL 100 ML: 350 INJECTION, SOLUTION INTRAVENOUS at 01:07

## 2025-07-14 RX ADMIN — SODIUM CHLORIDE, POTASSIUM CHLORIDE, SODIUM LACTATE AND CALCIUM CHLORIDE 1000 ML: 600; 310; 30; 20 INJECTION, SOLUTION INTRAVENOUS at 03:07

## 2025-07-14 RX ADMIN — HYDROMORPHONE HYDROCHLORIDE 2 MG: 2 INJECTION, SOLUTION INTRAMUSCULAR; INTRAVENOUS; SUBCUTANEOUS at 10:07

## 2025-07-14 RX ADMIN — SODIUM CHLORIDE, POTASSIUM CHLORIDE, SODIUM LACTATE AND CALCIUM CHLORIDE 1000 ML: 600; 310; 30; 20 INJECTION, SOLUTION INTRAVENOUS at 09:07

## 2025-07-14 RX ADMIN — SODIUM CHLORIDE, POTASSIUM CHLORIDE, SODIUM LACTATE AND CALCIUM CHLORIDE 1000 ML: 600; 310; 30; 20 INJECTION, SOLUTION INTRAVENOUS at 02:07

## 2025-07-14 RX ADMIN — FAMOTIDINE 20 MG: 20 TABLET, FILM COATED ORAL at 09:07

## 2025-07-14 RX ADMIN — HYDROMORPHONE HYDROCHLORIDE 1 MG: 2 INJECTION, SOLUTION INTRAMUSCULAR; INTRAVENOUS; SUBCUTANEOUS at 07:07

## 2025-07-14 RX ADMIN — HYDROMORPHONE HYDROCHLORIDE 2 MG: 2 INJECTION, SOLUTION INTRAMUSCULAR; INTRAVENOUS; SUBCUTANEOUS at 04:07

## 2025-07-14 RX ADMIN — HYDROMORPHONE HYDROCHLORIDE 1 MG: 2 INJECTION, SOLUTION INTRAMUSCULAR; INTRAVENOUS; SUBCUTANEOUS at 02:07

## 2025-07-14 RX ADMIN — PIPERACILLIN AND TAZOBACTAM 4.5 G: 4; .5 INJECTION, POWDER, LYOPHILIZED, FOR SOLUTION INTRAVENOUS; PARENTERAL at 05:07

## 2025-07-14 RX ADMIN — HYDROMORPHONE HYDROCHLORIDE 1 MG: 2 INJECTION, SOLUTION INTRAMUSCULAR; INTRAVENOUS; SUBCUTANEOUS at 03:07

## 2025-07-14 RX ADMIN — METOPROLOL TARTRATE 5 MG: 1 INJECTION, SOLUTION INTRAVENOUS at 10:07

## 2025-07-14 RX ADMIN — MORPHINE SULFATE 4 MG: 4 INJECTION INTRAVENOUS at 12:07

## 2025-07-14 RX ADMIN — MORPHINE SULFATE 4 MG: 4 INJECTION, SOLUTION INTRAMUSCULAR; INTRAVENOUS at 06:07

## 2025-07-14 RX ADMIN — ONDANSETRON 4 MG: 2 INJECTION INTRAMUSCULAR; INTRAVENOUS at 07:07

## 2025-07-14 RX ADMIN — PANTOPRAZOLE SODIUM 40 MG: 40 INJECTION, POWDER, FOR SOLUTION INTRAVENOUS at 01:07

## 2025-07-14 RX ADMIN — SODIUM CHLORIDE: 4.5 INJECTION, SOLUTION INTRAVENOUS at 01:07

## 2025-07-14 RX ADMIN — LABETALOL HYDROCHLORIDE 10 MG: 5 INJECTION, SOLUTION INTRAVENOUS at 04:07

## 2025-07-14 RX ADMIN — SODIUM CHLORIDE: 4.5 INJECTION, SOLUTION INTRAVENOUS at 08:07

## 2025-07-14 NOTE — PLAN OF CARE
Problem: Adult Inpatient Plan of Care  Goal: Plan of Care Review  Outcome: Progressing  Flowsheets (Taken 7/14/2025 1537)  Plan of Care Reviewed With: patient  Goal: Patient-Specific Goal (Individualized)  Outcome: Progressing  Flowsheets (Taken 7/14/2025 1537)  Individualized Care Needs: Safety with mobility, Pain management, IV hydration  Anxieties, Fears or Concerns: Abdominal pain  Patient/Family-Specific Goals (Include Timeframe): Improved condition by discharge  Goal: Absence of Hospital-Acquired Illness or Injury  Outcome: Progressing  Intervention: Identify and Manage Fall Risk  Flowsheets (Taken 7/14/2025 1537)  Safety Promotion/Fall Prevention: assistive device/personal item within reach  Intervention: Prevent Skin Injury  Flowsheets (Taken 7/14/2025 1537)  Body Position: position changed independently  Intervention: Prevent and Manage VTE (Venous Thromboembolism) Risk  Flowsheets (Taken 7/14/2025 1537)  VTE Prevention/Management: remove, assess skin, and reapply sequential compression device  Intervention: Prevent Infection  Flowsheets (Taken 7/14/2025 1537)  Infection Prevention: environmental surveillance performed  Goal: Optimal Comfort and Wellbeing  Outcome: Progressing  Intervention: Monitor Pain and Promote Comfort  Flowsheets (Taken 7/14/2025 1537)  Pain Management Interventions:   care clustered   medication offered   quiet environment facilitated   pain management plan reviewed with patient/caregiver  Intervention: Provide Person-Centered Care  Flowsheets (Taken 7/14/2025 1537)  Trust Relationship/Rapport:   care explained   questions answered   questions encouraged  Goal: Readiness for Transition of Care  Outcome: Progressing  Intervention: Mutually Develop Transition Plan  Flowsheets (Taken 7/14/2025 1537)  Transportation Anticipated: car, drives self  Communicated JOHN with patient/caregiver: Date not available/Unable to determine  Do you expect to return to your current living situation?:  Yes     Problem: Bariatric Environmental Safety  Goal: Safety Maintained with Care  Outcome: Progressing     Problem: Pancreatitis  Goal: Fluid and Electrolyte Balance  Outcome: Progressing  Goal: Absence of Infection Signs and Symptoms  Outcome: Progressing  Goal: Optimal Nutrition Delivery  Outcome: Progressing  Goal: Optimal Pain Control and Function  Outcome: Progressing  Intervention: Monitor and Manage Pain  Flowsheets (Taken 7/14/2025 7124)  Pain Management Interventions:   care clustered   medication offered   quiet environment facilitated   pain management plan reviewed with patient/caregiver  Goal: Effective Oxygenation and Ventilation  Outcome: Progressing  Intervention: Optimize Oxygenation and Ventilation  Flowsheets (Taken 7/14/2025 8119)  Activity Management: Back in bed

## 2025-07-14 NOTE — PLAN OF CARE
Problem: Adult Inpatient Plan of Care  Goal: Plan of Care Review  7/14/2025 0533 by Lori Cohn RN  Outcome: Progressing  Flowsheets (Taken 7/14/2025 0300)  Plan of Care Reviewed With: patient  Goal: Patient-Specific Goal (Individualized)  7/14/2025 0533 by Lori Cohn RN  Outcome: Progressing  Flowsheets (Taken 7/14/2025 0300)  Individualized Care Needs:   Safety with mobility   Pain management   IV hydration   Monitor for s/s of worsening infection.  Anxieties, Fears or Concerns: Abdominal pain  Patient/Family-Specific Goals (Include Timeframe): Improved condition by JOHN for discharge to home with self care.  Goal: Absence of Hospital-Acquired Illness or Injury  7/14/2025 0533 by Lori Cohn RN  Outcome: Progressing  Intervention: Prevent Skin Injury  Flowsheets (Taken 7/14/2025 0300)  Skin Protection: incontinence pads utilized  Device Skin Pressure Protection:   tubing/devices free from skin contact   absorbent pad utilized/changed  Intervention: Prevent and Manage VTE (Venous Thromboembolism) Risk  Flowsheets (Taken 7/14/2025 0300)  VTE Prevention/Management: (SCDs applied)   bleeding risk assessed   bleeding precautions maintained   ambulation promoted   remove, assess skin, and reapply sequential compression device   dorsiflexion/plantar flexion performed   ROM (active) performed   intravenous hydration  Goal: Optimal Comfort and Wellbeing  7/14/2025 0533 by Lori Cohn RN  Outcome: Progressing  Intervention: Monitor Pain and Promote Comfort  Flowsheets (Taken 7/14/2025 0300)  Pain Management Interventions:   care clustered   pain management plan reviewed with patient/caregiver   medication offered   position adjusted  Goal: Readiness for Transition of Care  Outcome: Progressing     Problem: Bariatric Environmental Safety  Goal: Safety Maintained with Care  7/14/2025 0533 by Lori Cohn RN  Outcome: Progressing     Problem: Pancreatitis  Goal: Fluid and Electrolyte Balance  Outcome:  Progressing  Goal: Absence of Infection Signs and Symptoms  Outcome: Progressing  Intervention: Prevent or Manage Infection  Flowsheets (Taken 7/14/2025 0300)  Fever Reduction/Comfort Measures:   lightweight bedding   lightweight clothing  Infection Management: aseptic technique maintained  Goal: Optimal Pain Control and Function  Outcome: Progressing  Intervention: Monitor and Manage Pain  Flowsheets (Taken 7/14/2025 0300)  Sleep/Rest Enhancement:   awakenings minimized   regular sleep/rest pattern promoted  Sensory Stimulation Regulation:   television on   quiet environment promoted  Diversional Activities: television  Goal: Effective Oxygenation and Ventilation  Outcome: Progressing  Intervention: Optimize Oxygenation and Ventilation  Flowsheets (Taken 7/14/2025 0300)  Breathing Techniques/Airway Clearance: deep/controlled cough encouraged

## 2025-07-14 NOTE — H&P
Hospital Medicine  History & Physical Examination       Patient: Jina Levy  MRN: [id number]  STATUS: OP- Observation   DOS: 7/14/2025   PCP: Violet, Primary Doctor      CC: abdominal pain       HISTORY OF PRESENT ILLNESS   25 y.o. female with a history that includes obesity, presented to ED with abdominal pain since yesterday morning at 4 AM. Reports pain worsen ing, now constant located in epigastric region and radiate to RUQ. Lipase > 1200 and CT A/P consistent with acute pancreatitis.     REVIEW OF SYSTEMS     Except as documented, all other systems reviewed and negative       PAST MEDICAL HISTORY     Past Medical History:   Diagnosis Date    Anemia, unspecified     GERD (gastroesophageal reflux disease)           PAST SURGICAL HISTORY     Past Surgical History:   Procedure Laterality Date    WISDOM TOOTH EXTRACTION            FAMILY HISTORY     Reviewed, negative in relation to patient's current condition.      SOCIAL HISTORY     Denies alcohol, tobacco or drug abuse    Social History     Tobacco Use    Smoking status: Never    Smokeless tobacco: Never   Substance Use Topics    Alcohol use: Not Currently          ALLERGIES     Patient has no known allergies.      HOME MEDICATIONS     Current Outpatient Medications   Medication Instructions    EScitalopram oxalate (LEXAPRO) 10 mg, Oral, Daily          PHYSICAL EXAM   VITALS: T 97.9 °F (36.6 °C)   /86   P (!) 139   RR 20   O2 96 %    GENERAL: Awake and in NAD  HEENT: NC/AT, EOMI, PERRL.  NECK: Supple,  No JVD  LUNGS: CTA B/L  CVS: RRR, S1S2 positive  GI/: Soft, NT/ND, bowel sounds positive.  EXTREMITIES: Peripheral pulses 2+, no peripheral edema  DERM: Warm, dry.  No rashes or lesions noted.  NEURO: AAOx3, no focal neurologic deficit  PSYCH: Cooperative, appropriate mood and affect       DIAGNOSTICS     Recent Labs     07/13/25  2236   WBC 14.38*   RBC 7.50*   HGB 15.8   HCT 52.3*   MCV 69.7*   MCH 21.1*   MCHC 30.2*   RDW 20.7*   *     No  "results for input(s): "LACTIC" in the last 72 hours.  No results for input(s): "INR", "APTT", "D-DIMER" in the last 72 hours.  Recent Labs     07/14/25  0204   TRIG 110      Recent Labs     07/13/25  2236 07/14/25  0204     --    K 3.4*  --    CO2 21*  --    BUN 12.2  --    CREATININE 0.74  --    CALCIUM 9.1  --    ALBUMIN 3.6  --    GLOBULIN 4.6*  --    ALKPHOS 221*  --    *  --    *  --    BILITOT 1.9*  --    LIPASE 1,229*  --    LDH  --  459*     No results for input(s): "BNP", "CPK", "TROPONINI" in the last 72 hours.       CT Abdomen Pelvis With IV Contrast NO Oral Contrast  Narrative: Technique: CT of the abdomen and pelvis was performed with axial images as well as sagittal and coronal reconstruction images with intravenous contrast.    Comparison: Comparison is with study dated January 11, 2021    Clinical History: Abd pain with nausea and vomiting.    Dosage Information: Automated Exposure Control was utilized 1319.35 mGy.cm.    Findings:    Lines and Tubes: None.    Thorax:    Lungs: The visualized lung bases appear unremarkable.    Pleura: No effusions or thickening are seen. No pneumothorax is seen in the visualized lung bases.    Abdomen:    Abdominal Wall: No abdominal wall pathology is seen.    Liver: Liver is remarkable for steatosis.    Biliary System: No intrahepatic or extrahepatic biliary duct dilatation is seen.    Gallbladder: The gallbladder appears unremarkable with no stones wall thickening or pericholecystic inflammatory changes or fluid.    Pancreas: The pancreas appears severely edematous with surrounding peripancreatic fat stranding and fluid in the peripancreatic, perisplenic, perihepatic, bilateral pararenal spaces, bilateral paracolic gutters and pelvic spaces. This is consistent with acute pancreatitis. No discrete organized fluid collection is identified.    Spleen: The spleen appears unremarkable.    Adrenals: The adrenal glands appear unremarkable.    Kidneys: " The kidneys appear unremarkable with no stones cysts masses or hydronephrosis with IV contrast decreasing sensitivity and specificity for stones.    Aorta: The visualized abdominal aorta appears unremarkable.    Bowel:    Esophagus: The visualized distal esophagus appears unremarkable.    Stomach: The stomach appears unremarkable.    Duodenum: Unremarkable appearing duodenum.    Small Bowel: The small bowel appears unremarkable.    Colon: Nondistended.    Appendix: The appendix is not identified but no inflammatory changes are seen in the right lower quadrant to suggest appendicitis.    Peritoneum: No free intraperitoneal air is seen.    Pelvis:    Bladder: The bladder appears unremarkable.    Female:    Uterus: The uterus appears unremarkable for age.    Ovaries: No adnexal masses are seen.    Bony structures:    Dorsal Spine: The visualized dorsal spine appears unremarkable.    Bony Pelvis: The visualized bony structures of the pelvis appear unremarkable.  Impression: Impression:    1. The pancreas appears severely edematous with surrounding peripancreatic fat stranding and fluid in the peripancreatic, perisplenic, perihepatic, bilateral pararenal spaces, bilateral paracolic gutters and pelvic spaces. This is consistent with acute pancreatitis. Correlate with clinical and laboratory findings as regards additional evaluation and follow-up.    2. Details and other findings as discussed above.    No significant discrepancy with overnight report.    Electronically signed by: García Canada  Date:    07/14/2025  Time:    06:57        ASSESSMENT   Acute Pancreatitis   Obesity BMI 54  PLAN   Admit to observation   Pain control  Ivfs  Antiemetic's prn  npo      Prophylaxis:   Code Status:       Patient Screened for food insecurity, housing instability, transportation needs, utility difficulties, and interpersonal safety.  No needs identified      Can Betancur MD  [facility]

## 2025-07-14 NOTE — NURSING
[facility] ambulance called for transport to [facility] General room 309. Accepting Dr Powers.   ETA 1915 pm

## 2025-07-14 NOTE — ED PROVIDER NOTES
Encounter Date: 7/13/2025       History     Chief Complaint   Patient presents with    Abdominal Pain     Pt c/o generalized abd pain, weakness & vomiting since 4 am; denies any other s/s     25-year-old female presents with generalized abdominal pain, weakness, nausea and vomiting since early this morning; no fever or diarrhea; patient is morbidly obese and has a past medical history of iron-deficiency anemia and GERD; occasional alcohol intake    The history is provided by the patient.     Review of patient's allergies indicates:  No Known Allergies  Past Medical History:   Diagnosis Date    Anemia, unspecified     GERD (gastroesophageal reflux disease)      Past Surgical History:   Procedure Laterality Date    WISDOM TOOTH EXTRACTION       No family history on file.  Social History[1]  Review of Systems   Constitutional: Negative.    HENT: Negative.     Respiratory: Negative.     Cardiovascular: Negative.    Gastrointestinal:         GERD   Musculoskeletal: Negative.    Skin: Negative.    Neurological: Negative.    All other systems reviewed and are negative.      Physical Exam     Initial Vitals [07/13/25 2125]   BP Pulse Resp Temp SpO2   (!) 99/39 105 20 97.6 °F (36.4 °C) 96 %      MAP       --         Physical Exam    Nursing note and vitals reviewed.  Constitutional: She appears well-developed and well-nourished.   HENT:   Head: Normocephalic and atraumatic. Mouth/Throat: Oropharynx is clear and moist.   Eyes: EOM are normal. Pupils are equal, round, and reactive to light.   Neck: Neck supple.   Normal range of motion.  Cardiovascular:  Regular rhythm, normal heart sounds and intact distal pulses.   Tachycardia present.         Pulmonary/Chest: Breath sounds normal.   Abdominal: Abdomen is soft. Bowel sounds are normal. There is abdominal tenderness (generalized, moderate).   Obese There is guarding.   Musculoskeletal:         General: Normal range of motion.      Cervical back: Normal range of motion and  neck supple.     Neurological: She is alert and oriented to person, place, and time. She has normal strength. GCS score is 15. GCS eye subscore is 4. GCS verbal subscore is 5. GCS motor subscore is 6.   Skin: Skin is warm and dry.         ED Course   Procedures  Labs Reviewed   URINALYSIS, REFLEX TO URINE CULTURE - Abnormal       Result Value    Color, UA Yellow      Appearance, UA Clear      Specific Gravity, UA 1.025      pH, UA 6.0      Protein,  (*)     Glucose, UA Negative      Ketones, UA Negative      Blood, UA Small (*)     Bilirubin, UA Moderate (*)     Urobilinogen, UA 0.2      Nitrites, UA Positive (*)     Leukocyte Esterase, UA Trace (*)    COMPREHENSIVE METABOLIC PANEL - Abnormal    Sodium 144      Potassium 3.4 (*)     Chloride 106      CO2 21 (*)     Glucose 161 (*)     Blood Urea Nitrogen 12.2      Creatinine 0.74      Calcium 9.1      Protein Total 8.2      Albumin 3.6      Globulin 4.6 (*)     Albumin/Globulin Ratio 0.8 (*)     Bilirubin Total 1.9 (*)      (*)      (*)      (*)     eGFR >60      Anion Gap 17.0      BUN/Creatinine Ratio 16     LIPASE - Abnormal    Lipase Level 1,229 (*)    CBC WITH DIFFERENTIAL - Abnormal    WBC 14.38 (*)     RBC 7.50 (*)     Hgb 15.8      Hct 52.3 (*)     MCV 69.7 (*)     MCH 21.1 (*)     MCHC 30.2 (*)     RDW 20.7 (*)     Platelet 434 (*)     MPV 10.0      Neut % 84.1      Lymph % 11.3      Mono % 4.2      Eos % 0.0      Basophil % 0.1      Imm Grans % 0.3      Neut # 12.10 (*)     Lymph # 1.62      Mono # 0.60      Eos # 0.00      Baso # 0.01      Imm Gran # 0.05 (*)    BLOOD SMEAR MICROSCOPIC EXAM (OLG) - Abnormal    RBC Morph Abnormal (*)     Anisocytosis Slight (*)     Platelets Adequate     URINALYSIS, MICROSCOPIC - Abnormal    Bacteria, UA Moderate (*)     RBC, UA 0-5      WBC, UA 3-5      Squamous Epithelial Cells, UA Many (*)    PREGNANCY TEST, URINE RAPID - Normal    hCG Qualitative, Urine Negative     CBC W/ AUTO DIFFERENTIAL     Narrative:     The following orders were created for panel order CBC auto differential.  Procedure                               Abnormality         Status                     ---------                               -----------         ------                     CBC with Differential[[id number]]       Abnormal            Final result                 Please view results for these tests on the individual orders.          Imaging Results              CT Abdomen Pelvis With IV Contrast NO Oral Contrast (Preliminary result)  Result time 07/14/25 01:40:15      Preliminary result by Jared Stephen MD (07/14/25 01:40:15)                   Narrative:    START OF REPORT:  Technique: CT of the abdomen and pelvis was performed with axial images as well as sagittal and coronal reconstruction images with intravenous contrast.    Comparison: Comparison is with study dated 2021-01-11 19:16:37.    Clinical History: Abd pain with nausea and vomiting.    Dosage Information: Automated Exposure Control was utilized 1319.35 mGy.cm.    Findings:  Lines and Tubes: None.  Thorax:  Lungs: The visualized lung bases appear unremarkable.  Pleura: No effusions or thickening are seen. No pneumothorax is seen in the visualized lung bases.  Heart: The heart size is within normal limits.  Abdomen:  Abdominal Wall: No abdominal wall pathology is seen.  Liver: The liver appears unremarkable.  Biliary System: No intrahepatic or extrahepatic biliary duct dilatation is seen.  Gallbladder: The gallbladder appears unremarkable with no stones wall thickening or pericholecystic inflammatory changes or fluid.  Pancreas: The pancreas appears severely edematous with surrounding peripancreatic fat stranding and fluid in the peripancreatic, perisplenic, perihepatic, bilateral pararenal spaces, bilateral paracolic gutters and pelvic spaces. This is consistent with acute pancreatitis. No discrete organized fluid collection is identified.  Spleen: The spleen  appears unremarkable.  Adrenals: The adrenal glands appear unremarkable.  Kidneys: The kidneys appear unremarkable with no stones cysts masses or hydronephrosis with IV contrast decreasing sensitivity and specificity for stones.  Aorta: The visualized abdominal aorta appears unremarkable.  IVC: Unremarkable.  Bowel:  Esophagus: The visualized distal esophagus appears unremarkable.  Stomach: The stomach appears unremarkable.  Duodenum: Unremarkable appearing duodenum.  Small Bowel: The small bowel appears unremarkable.  Colon: Nondistended.  Appendix: The appendix is not identified but no inflammatory changes are seen in the right lower quadrant to suggest appendicitis.  Peritoneum: No free intraperitoneal air is seen.    Pelvis:  Bladder: The bladder appears unremarkable.  Female:  Uterus: The uterus appears unremarkable for age.  Ovaries: No adnexal masses are seen.    Bony structures:  Dorsal Spine: The visualized dorsal spine appears unremarkable.  Bony Pelvis: The visualized bony structures of the pelvis appear unremarkable.      Impression:  1. The pancreas appears severely edematous with surrounding peripancreatic fat stranding and fluid in the peripancreatic, perisplenic, perihepatic, bilateral pararenal spaces, bilateral paracolic gutters and pelvic spaces. This is consistent with acute pancreatitis. Correlate with clinical and laboratory findings as regards additional evaluation and follow-up.  2. Details and other findings as discussed above.                                         Medications   lactated ringers infusion (1,000 mLs Intravenous New Bag 7/14/25 0315)   sodium chloride 0.9% flush 2 mL (has no administration in time range)   melatonin tablet 6 mg (has no administration in time range)   acetaminophen tablet 650 mg (has no administration in time range)   HYDROmorphone (PF) injection 0.5 mg (has no administration in time range)   HYDROmorphone (PF) injection 1 mg (1 mg Intravenous Given 7/14/25  0310)   senna-docusate 8.6-50 mg per tablet 1 tablet (has no administration in time range)   polyethylene glycol packet 17 g (has no administration in time range)   loperamide capsule 2 mg (has no administration in time range)   famotidine tablet 20 mg (has no administration in time range)   ondansetron injection 4 mg (has no administration in time range)   prochlorperazine injection Soln 5 mg (has no administration in time range)   acetaminophen tablet 650 mg (has no administration in time range)   cefTRIAXone injection 1 g (1 g Intravenous Given 7/14/25 0409)   labetalol 20 mg/4 mL (5 mg/mL) IV syring (10 mg Intravenous Given 7/14/25 0403)   sodium chloride 0.9% bolus 1,000 mL 1,000 mL (0 mLs Intravenous Stopped 7/13/25 2352)   ondansetron injection 4 mg (4 mg Intravenous Given 7/13/25 2241)   ondansetron injection 4 mg (4 mg Intravenous Given 7/13/25 2315)   morphine injection 4 mg (4 mg Intravenous Given 7/13/25 2315)   sodium chloride 0.9% bolus 1,000 mL 1,000 mL (0 mLs Intravenous Stopped 7/14/25 0100)   morphine injection 4 mg (4 mg Intravenous Given 7/14/25 0053)   iohexoL (OMNIPAQUE 350) injection 100 mL (100 mLs Intravenous Given 7/14/25 0127)   HYDROmorphone (PF) injection 1 mg (1 mg Intravenous Given 7/14/25 0209)     Medical Decision Making  25-year-old female presents with generalized abdominal pain, weakness, nausea and vomiting since early this morning; no fever or diarrhea    DIFFERENTIAL DIAGNOSIS- viral illness, dehydration, food toxicity, cholecystitis, hepatitis, pancreatitis    Amount and/or Complexity of Data Reviewed  External Data Reviewed: notes.  Labs: ordered. Decision-making details documented in ED Course.  Radiology: ordered. Decision-making details documented in ED Course.  Discussion of management or test interpretation with external provider(s): Discussed patient's case with Dr. Betancur, hospitalist [facility]; I reviewed HPI, past medical history, physical exam,  "lab findings, CT findings, treatment and response to treatment; patient will be admitted to observation unit with IVF, antiemetics, NPO and pain medication    Risk  OTC drugs.  Prescription drug management.  Parenteral controlled substances.  Decision regarding hospitalization.  Risk Details: Patient given 1000 mL normal saline and Zofran 4 mg IV; patient given morphine for pain which seemed to manage her pain initially; findings of acute pancreatitis with elevated lipase, also had modest elevations of ALP and liver enzymes; total bilirubin was 1.9; patient had trouble with urination so a 2nd 1000 mL normal saline given and she began voiding, UPT negative and CT abdomen done which revealed the suspected acute pancreatitis, no evidence of gallstones or gallbladder edema or pericholecystic fluid; the pancreas was edematous but no evidence of necrosis or pseudocyst;  Patient began having recurrence of pain after the morphine wear off and she was given Dilaudid 1 mg IV;    Patient will be admitted with IV fluids, pain management, NPO and antiemetics;  She has had 2000 mL normal saline and will start lactated Ringer's at 200 mL/hour and monitor vital signs closely  Her nausea is well controlled               ED Course as of 07/14/25 0527   Sun Jul 13, 2025   2301 Lipase(!): 1,229 [DD]   2302 AST(!): 162 [DD]   2302 ALT(!): 177 [DD]   2302 ALP(!): 221 [DD]   2302 BILIRUBIN TOTAL(!): 1.9 [DD]   Mon Jul 14, 2025   0116 WBC(!): 14.38 [DD]      ED Course User Index  [DD] Bk Rod MD         Patient Vitals for the past 24 hrs:   BP Temp Temp src Pulse Resp SpO2 Height Weight   07/14/25 0500 -- -- -- -- -- -- -- (!) 172.5 kg (380 lb 4.7 oz)   07/14/25 0442 -- -- -- -- -- 98 % -- --   07/14/25 0441 117/84 -- -- 108 -- 98 % -- --   07/14/25 0425 -- -- -- 107 -- 98 % -- --   07/14/25 0424 -- -- -- -- -- -- 5' 10" (1.778 m) (!) 172.5 kg (380 lb 4.7 oz)   07/14/25 0404 118/76 -- -- (!) 132 -- 98 % -- --   07/14/25 0341 (!) " "124/90 -- -- (!) 129 -- 97 % -- --   07/14/25 0322 (!) 143/110 -- -- (!) 138 -- 98 % -- --   07/14/25 0310 -- -- -- -- 18 -- -- --   07/14/25 0300 (!) 137/113 98.2 °F (36.8 °C) Oral (!) 137 19 97 % -- --   07/14/25 0209 -- -- -- -- 18 -- -- --   07/14/25 0137 (!) 142/94 -- -- (!) 114 18 100 % -- --   07/14/25 0053 -- -- -- -- 19 -- -- --   07/13/25 2351 (!) 123/95 -- -- (!) 112 19 100 % -- --   07/13/25 2315 -- -- -- -- 19 -- -- --   07/13/25 2241 128/79 -- -- 108 19 100 % -- --   07/13/25 2144 127/85 -- -- 109 20 100 % -- --   07/13/25 2125 (!) 99/39 97.6 °F (36.4 °C) Oral 105 20 96 % 5' 9" (1.753 m) (!) 167.8 kg (370 lb)                           Clinical Impression:  Final diagnoses:  [K85.00] Idiopathic acute pancreatitis without infection or necrosis (Primary)  [E66.01] Morbid obesity  [R74.01] Transaminitis          ED Disposition Condition    Observation                       [1]   Social History  Tobacco Use    Smoking status: Never    Smokeless tobacco: Never   Substance Use Topics    Alcohol use: Not Currently    Drug use: Not Currently     Types: Marijuana        Bk Rod MD  07/14/25 0527    "

## 2025-07-15 PROBLEM — R74.8 ELEVATED LIVER ENZYMES: Status: ACTIVE | Noted: 2025-07-15

## 2025-07-15 PROBLEM — E43 SEVERE PROTEIN-CALORIE MALNUTRITION: Status: ACTIVE | Noted: 2025-07-15

## 2025-07-15 PROBLEM — K80.00 CHOLELITHIASIS WITH ACUTE CHOLECYSTITIS: Status: ACTIVE | Noted: 2025-07-15

## 2025-07-15 PROBLEM — E83.51 HYPOCALCEMIA: Status: ACTIVE | Noted: 2025-07-15

## 2025-07-15 PROBLEM — J18.9 COMMUNITY ACQUIRED PNEUMONIA OF RIGHT MIDDLE LOBE OF LUNG: Status: ACTIVE | Noted: 2025-07-15

## 2025-07-15 PROBLEM — K80.20 CHOLELITHIASES: Status: ACTIVE | Noted: 2025-07-15

## 2025-07-15 PROBLEM — R73.9 HYPERGLYCEMIA: Status: ACTIVE | Noted: 2025-07-15

## 2025-07-15 PROBLEM — E66.01 MORBID OBESITY: Status: ACTIVE | Noted: 2025-07-15

## 2025-07-15 LAB
25(OH)D3+25(OH)D2 SERPL-MCNC: 11 NG/ML (ref 30–80)
ABS NEUT CALC (OHS): 13.28 X10(3)/MCL (ref 2.1–9.2)
ALBUMIN SERPL-MCNC: 2.5 G/DL (ref 3.5–5)
ALBUMIN SERPL-MCNC: 2.6 G/DL (ref 3.5–5)
ALBUMIN SERPL-MCNC: 2.6 G/DL (ref 3.5–5)
ALBUMIN/GLOB SERPL: 0.7 RATIO (ref 1.1–2)
ALLENS TEST: ABNORMAL
ALP SERPL-CCNC: 100 UNIT/L (ref 40–150)
ALP SERPL-CCNC: 123 UNIT/L (ref 40–150)
ALP SERPL-CCNC: 127 UNIT/L (ref 40–150)
ALT SERPL-CCNC: 68 UNIT/L (ref 0–55)
ALT SERPL-CCNC: 80 UNIT/L (ref 0–55)
ALT SERPL-CCNC: 81 UNIT/L (ref 0–55)
ANION GAP SERPL CALC-SCNC: 12 MEQ/L
ANION GAP SERPL CALC-SCNC: 12 MEQ/L
ANION GAP SERPL CALC-SCNC: 13 MEQ/L
ANION GAP SERPL CALC-SCNC: 13 MEQ/L
AST SERPL-CCNC: 66 UNIT/L (ref 11–45)
AST SERPL-CCNC: 72 UNIT/L (ref 11–45)
AST SERPL-CCNC: 78 UNIT/L (ref 11–45)
BASOPHILS # BLD AUTO: 0.03 X10(3)/MCL
BASOPHILS NFR BLD AUTO: 0.2 %
BILIRUB SERPL-MCNC: 1.4 MG/DL
BILIRUB SERPL-MCNC: 1.5 MG/DL
BILIRUB SERPL-MCNC: 1.7 MG/DL
BUN SERPL-MCNC: 17 MG/DL (ref 7–18.7)
BUN SERPL-MCNC: 20 MG/DL (ref 7–18.7)
BUN SERPL-MCNC: 20 MG/DL (ref 7–18.7)
BUN SERPL-MCNC: 21 MG/DL (ref 7–18.7)
CALCIUM SERPL-MCNC: 5 MG/DL (ref 8.4–10.2)
CALCIUM SERPL-MCNC: 5.2 MG/DL (ref 8.4–10.2)
CALCIUM SERPL-MCNC: 5.2 MG/DL (ref 8.4–10.2)
CALCIUM SERPL-MCNC: 5.3 MG/DL (ref 8.4–10.2)
CHLORIDE SERPL-SCNC: 102 MMOL/L (ref 98–107)
CHLORIDE SERPL-SCNC: 103 MMOL/L (ref 98–107)
CHLORIDE SERPL-SCNC: 103 MMOL/L (ref 98–107)
CHLORIDE SERPL-SCNC: 104 MMOL/L (ref 98–107)
CO2 SERPL-SCNC: 18 MMOL/L (ref 22–29)
CO2 SERPL-SCNC: 18 MMOL/L (ref 22–29)
CO2 SERPL-SCNC: 19 MMOL/L (ref 22–29)
CO2 SERPL-SCNC: 19 MMOL/L (ref 22–29)
CREAT SERPL-MCNC: 0.77 MG/DL (ref 0.55–1.02)
CREAT SERPL-MCNC: 0.78 MG/DL (ref 0.55–1.02)
CREAT SERPL-MCNC: 0.8 MG/DL (ref 0.55–1.02)
CREAT SERPL-MCNC: 0.95 MG/DL (ref 0.55–1.02)
CREAT/UREA NIT SERPL: 22
CREAT/UREA NIT SERPL: 22
CREAT/UREA NIT SERPL: 25
CREAT/UREA NIT SERPL: 26
DELSYS: ABNORMAL
EOSINOPHIL # BLD AUTO: 0 X10(3)/MCL (ref 0–0.9)
EOSINOPHIL NFR BLD AUTO: 0 %
EP: 9
ERYTHROCYTE [DISTWIDTH] IN BLOOD BY AUTOMATED COUNT: 20.6 % (ref 11.5–17)
ERYTHROCYTE [DISTWIDTH] IN BLOOD BY AUTOMATED COUNT: 21.2 % (ref 11.5–17)
ERYTHROCYTE [SEDIMENTATION RATE] IN BLOOD BY WESTERGREN METHOD: 20 MM/H
EST. AVERAGE GLUCOSE BLD GHB EST-MCNC: 108.3 MG/DL
FIO2: 40
FLUAV AG UPPER RESP QL IA.RAPID: NOT DETECTED
FLUBV AG UPPER RESP QL IA.RAPID: NOT DETECTED
GFR SERPLBLD CREATININE-BSD FMLA CKD-EPI: >60 ML/MIN/1.73/M2
GLOBULIN SER-MCNC: 3.6 GM/DL (ref 2.4–3.5)
GLOBULIN SER-MCNC: 3.7 GM/DL (ref 2.4–3.5)
GLOBULIN SER-MCNC: 3.9 GM/DL (ref 2.4–3.5)
GLUCOSE SERPL-MCNC: 144 MG/DL (ref 74–100)
GLUCOSE SERPL-MCNC: 265 MG/DL (ref 74–100)
GLUCOSE SERPL-MCNC: 274 MG/DL (ref 74–100)
GLUCOSE SERPL-MCNC: 312 MG/DL (ref 74–100)
GROUP & RH: NORMAL
HBA1C MFR BLD: 5.4 %
HCO3 UR-SCNC: 16.9 MMOL/L (ref 24–28)
HCO3 UR-SCNC: 19.4 MMOL/L (ref 24–28)
HCO3 UR-SCNC: 20.1 MMOL/L (ref 24–28)
HCT VFR BLD AUTO: 42.2 % (ref 37–47)
HCT VFR BLD AUTO: 46.6 % (ref 37–47)
HGB BLD-MCNC: 12.9 G/DL (ref 12–16)
HGB BLD-MCNC: 13.9 G/DL (ref 12–16)
IMM GRANULOCYTES # BLD AUTO: 0.06 X10(3)/MCL (ref 0–0.04)
IMM GRANULOCYTES NFR BLD AUTO: 0.4 %
INDIRECT COOMBS: NORMAL
IP: 16
LACTATE SERPL-SCNC: 3.7 MMOL/L (ref 0.5–2.2)
LACTATE SERPL-SCNC: 5.1 MMOL/L (ref 0.5–2.2)
LACTATE SERPL-SCNC: 6.2 MMOL/L (ref 0.5–2.2)
LIPASE SERPL-CCNC: 1308 U/L
LYMPHOCYTES # BLD AUTO: 1.61 X10(3)/MCL (ref 0.6–4.6)
LYMPHOCYTES NFR BLD AUTO: 11.4 %
LYMPHOCYTES NFR BLD MANUAL: 14 % (ref 13–40)
LYMPHOCYTES NFR BLD MANUAL: 2.27 X10(3)/MCL (ref 0.6–4.6)
MAGNESIUM SERPL-MCNC: 1.3 MG/DL (ref 1.6–2.6)
MAGNESIUM SERPL-MCNC: 1.7 MG/DL (ref 1.6–2.6)
MAGNESIUM SERPL-MCNC: 1.8 MG/DL (ref 1.6–2.6)
MCH RBC QN AUTO: 21.3 PG (ref 27–31)
MCH RBC QN AUTO: 21.3 PG (ref 27–31)
MCHC RBC AUTO-ENTMCNC: 29.8 G/DL (ref 33–36)
MCHC RBC AUTO-ENTMCNC: 30.6 G/DL (ref 33–36)
MCV RBC AUTO: 69.5 FL (ref 80–94)
MCV RBC AUTO: 71.3 FL (ref 80–94)
MODE: ABNORMAL
MONOCYTES # BLD AUTO: 1.29 X10(3)/MCL (ref 0.1–1.3)
MONOCYTES NFR BLD AUTO: 9.1 %
MONOCYTES NFR BLD MANUAL: 0.65 X10(3)/MCL (ref 0.1–1.3)
MONOCYTES NFR BLD MANUAL: 4 % (ref 2–11)
NEUTROPHILS # BLD AUTO: 11.12 X10(3)/MCL (ref 2.1–9.2)
NEUTROPHILS NFR BLD AUTO: 78.9 %
NEUTROPHILS NFR BLD MANUAL: 69 % (ref 47–80)
NEUTS BAND NFR BLD MANUAL: 13 % (ref 0–11)
NRBC BLD AUTO-RTO: 0 %
NRBC BLD AUTO-RTO: 0 %
OHS QRS DURATION: 78 MS
OHS QRS DURATION: 80 MS
OHS QRS DURATION: 82 MS
OHS QTC CALCULATION: 422 MS
OHS QTC CALCULATION: 491 MS
OHS QTC CALCULATION: 517 MS
PCO2 BLDA: 29.5 MMHG (ref 35–45)
PCO2 BLDA: 30.9 MMHG (ref 35–45)
PCO2 BLDA: 38.7 MMHG (ref 35–45)
PH SMN: 7.33 [PH] (ref 7.35–7.45)
PH SMN: 7.34 [PH] (ref 7.35–7.45)
PH SMN: 7.42 [PH] (ref 7.35–7.45)
PHOSPHATE SERPL-MCNC: 2.5 MG/DL (ref 2.3–4.7)
PLATELET # BLD AUTO: 241 X10(3)/MCL (ref 130–400)
PLATELET # BLD AUTO: 275 X10(3)/MCL (ref 130–400)
PLATELET # BLD EST: ADEQUATE 10*3/UL
PMV BLD AUTO: ABNORMAL FL
PMV BLD AUTO: ABNORMAL FL
PO2 BLDA: 161 MMHG (ref 80–100)
PO2 BLDA: 72 MMHG (ref 80–100)
PO2 BLDA: 78 MMHG (ref 80–100)
POC BE: -5 MMOL/L (ref -2–2)
POC BE: -6 MMOL/L (ref -2–2)
POC BE: -9 MMOL/L (ref -2–2)
POC SATURATED O2: 100 % (ref 95–100)
POC SATURATED O2: 94 % (ref 95–100)
POC SATURATED O2: 94 % (ref 95–100)
POC TCO2: 18 MMOL/L (ref 23–27)
POC TCO2: 20 MMOL/L (ref 23–27)
POC TCO2: 21 MMOL/L (ref 23–27)
POCT GLUCOSE: 111 MG/DL (ref 70–110)
POCT GLUCOSE: 118 MG/DL (ref 70–110)
POCT GLUCOSE: 137 MG/DL (ref 70–110)
POCT GLUCOSE: 139 MG/DL (ref 70–110)
POCT GLUCOSE: 152 MG/DL (ref 70–110)
POCT GLUCOSE: 216 MG/DL (ref 70–110)
POCT GLUCOSE: 243 MG/DL (ref 70–110)
POCT GLUCOSE: 277 MG/DL (ref 70–110)
POCT GLUCOSE: 293 MG/DL (ref 70–110)
POCT GLUCOSE: 310 MG/DL (ref 70–110)
POCT GLUCOSE: 318 MG/DL (ref 70–110)
POCT GLUCOSE: 319 MG/DL (ref 70–110)
POCT GLUCOSE: 335 MG/DL (ref 70–110)
POTASSIUM SERPL-SCNC: 3.6 MMOL/L (ref 3.5–5.1)
POTASSIUM SERPL-SCNC: 4.1 MMOL/L (ref 3.5–5.1)
POTASSIUM SERPL-SCNC: 4.3 MMOL/L (ref 3.5–5.1)
POTASSIUM SERPL-SCNC: 4.7 MMOL/L (ref 3.5–5.1)
PROT SERPL-MCNC: 6.1 GM/DL (ref 6.4–8.3)
PROT SERPL-MCNC: 6.3 GM/DL (ref 6.4–8.3)
PROT SERPL-MCNC: 6.5 GM/DL (ref 6.4–8.3)
PTH-INTACT SERPL-MCNC: 220.2 PG/ML (ref 8.7–77)
RBC # BLD AUTO: 6.07 X10(6)/MCL (ref 4.2–5.4)
RBC # BLD AUTO: 6.54 X10(6)/MCL (ref 4.2–5.4)
RBC MORPH BLD: NORMAL
SAMPLE: ABNORMAL
SARS-COV-2 RNA RESP QL NAA+PROBE: NOT DETECTED
SITE: ABNORMAL
SODIUM SERPL-SCNC: 133 MMOL/L (ref 136–145)
SODIUM SERPL-SCNC: 134 MMOL/L (ref 136–145)
SODIUM SERPL-SCNC: 134 MMOL/L (ref 136–145)
SODIUM SERPL-SCNC: 135 MMOL/L (ref 136–145)
SPECIMEN OUTDATE: NORMAL
T4 FREE SERPL-MCNC: 0.93 NG/DL (ref 0.7–1.48)
TROPONIN I SERPL-MCNC: 0.16 NG/ML (ref 0–0.04)
TSH SERPL-ACNC: 0.19 UIU/ML (ref 0.35–4.94)
WBC # BLD AUTO: 14.11 X10(3)/MCL (ref 4.5–11.5)
WBC # BLD AUTO: 16.2 X10(3)/MCL (ref 4.5–11.5)

## 2025-07-15 PROCEDURE — 27000190 HC CPAP FULL FACE MASK W/VALVE

## 2025-07-15 PROCEDURE — 83735 ASSAY OF MAGNESIUM: CPT | Performed by: EMERGENCY MEDICINE

## 2025-07-15 PROCEDURE — 83605 ASSAY OF LACTIC ACID: CPT | Performed by: INTERNAL MEDICINE

## 2025-07-15 PROCEDURE — 20000000 HC ICU ROOM

## 2025-07-15 PROCEDURE — 63600175 PHARM REV CODE 636 W HCPCS: Performed by: SURGERY

## 2025-07-15 PROCEDURE — 85025 COMPLETE CBC W/AUTO DIFF WBC: CPT | Performed by: INTERNAL MEDICINE

## 2025-07-15 PROCEDURE — 25000003 PHARM REV CODE 250: Performed by: SURGERY

## 2025-07-15 PROCEDURE — 83735 ASSAY OF MAGNESIUM: CPT | Performed by: SURGERY

## 2025-07-15 PROCEDURE — 87040 BLOOD CULTURE FOR BACTERIA: CPT | Performed by: SURGERY

## 2025-07-15 PROCEDURE — 85025 COMPLETE CBC W/AUTO DIFF WBC: CPT | Performed by: EMERGENCY MEDICINE

## 2025-07-15 PROCEDURE — 87636 SARSCOV2 & INF A&B AMP PRB: CPT | Performed by: SURGERY

## 2025-07-15 PROCEDURE — 5A09357 ASSISTANCE WITH RESPIRATORY VENTILATION, LESS THAN 24 CONSECUTIVE HOURS, CONTINUOUS POSITIVE AIRWAY PRESSURE: ICD-10-PCS | Performed by: SURGERY

## 2025-07-15 PROCEDURE — 84484 ASSAY OF TROPONIN QUANT: CPT | Performed by: SURGERY

## 2025-07-15 PROCEDURE — 25000242 PHARM REV CODE 250 ALT 637 W/ HCPCS: Performed by: INTERNAL MEDICINE

## 2025-07-15 PROCEDURE — 05HY33Z INSERTION OF INFUSION DEVICE INTO UPPER VEIN, PERCUTANEOUS APPROACH: ICD-10-PCS | Performed by: SURGERY

## 2025-07-15 PROCEDURE — 84439 ASSAY OF FREE THYROXINE: CPT | Performed by: INTERNAL MEDICINE

## 2025-07-15 PROCEDURE — 83970 ASSAY OF PARATHORMONE: CPT | Performed by: INTERNAL MEDICINE

## 2025-07-15 PROCEDURE — C1751 CATH, INF, PER/CENT/MIDLINE: HCPCS

## 2025-07-15 PROCEDURE — 36569 INSJ PICC 5 YR+ W/O IMAGING: CPT

## 2025-07-15 PROCEDURE — 27000221 HC OXYGEN, UP TO 24 HOURS

## 2025-07-15 PROCEDURE — 94761 N-INVAS EAR/PLS OXIMETRY MLT: CPT | Mod: XB

## 2025-07-15 PROCEDURE — 83605 ASSAY OF LACTIC ACID: CPT | Performed by: SURGERY

## 2025-07-15 PROCEDURE — 83036 HEMOGLOBIN GLYCOSYLATED A1C: CPT | Performed by: SURGERY

## 2025-07-15 PROCEDURE — 83605 ASSAY OF LACTIC ACID: CPT | Performed by: EMERGENCY MEDICINE

## 2025-07-15 PROCEDURE — 94799 UNLISTED PULMONARY SVC/PX: CPT

## 2025-07-15 PROCEDURE — 99900035 HC TECH TIME PER 15 MIN (STAT)

## 2025-07-15 PROCEDURE — 82306 VITAMIN D 25 HYDROXY: CPT | Performed by: INTERNAL MEDICINE

## 2025-07-15 PROCEDURE — 5A0935A ASSISTANCE WITH RESPIRATORY VENTILATION, LESS THAN 24 CONSECUTIVE HOURS, HIGH NASAL FLOW/VELOCITY: ICD-10-PCS | Performed by: SURGERY

## 2025-07-15 PROCEDURE — 80053 COMPREHEN METABOLIC PANEL: CPT | Performed by: EMERGENCY MEDICINE

## 2025-07-15 PROCEDURE — 36415 COLL VENOUS BLD VENIPUNCTURE: CPT | Performed by: INTERNAL MEDICINE

## 2025-07-15 PROCEDURE — 36415 COLL VENOUS BLD VENIPUNCTURE: CPT | Performed by: SURGERY

## 2025-07-15 PROCEDURE — 63600175 PHARM REV CODE 636 W HCPCS: Performed by: EMERGENCY MEDICINE

## 2025-07-15 PROCEDURE — 94660 CPAP INITIATION&MGMT: CPT

## 2025-07-15 PROCEDURE — 84100 ASSAY OF PHOSPHORUS: CPT | Performed by: SURGERY

## 2025-07-15 PROCEDURE — 84443 ASSAY THYROID STIM HORMONE: CPT | Performed by: SURGERY

## 2025-07-15 PROCEDURE — 25000003 PHARM REV CODE 250: Performed by: EMERGENCY MEDICINE

## 2025-07-15 PROCEDURE — 63600175 PHARM REV CODE 636 W HCPCS: Performed by: INTERNAL MEDICINE

## 2025-07-15 PROCEDURE — 80053 COMPREHEN METABOLIC PANEL: CPT | Performed by: SURGERY

## 2025-07-15 PROCEDURE — 94640 AIRWAY INHALATION TREATMENT: CPT

## 2025-07-15 PROCEDURE — 36600 WITHDRAWAL OF ARTERIAL BLOOD: CPT

## 2025-07-15 PROCEDURE — 80053 COMPREHEN METABOLIC PANEL: CPT | Performed by: INTERNAL MEDICINE

## 2025-07-15 PROCEDURE — 25000003 PHARM REV CODE 250: Performed by: INTERNAL MEDICINE

## 2025-07-15 PROCEDURE — 83690 ASSAY OF LIPASE: CPT | Performed by: EMERGENCY MEDICINE

## 2025-07-15 PROCEDURE — 82803 BLOOD GASES ANY COMBINATION: CPT

## 2025-07-15 PROCEDURE — 51702 INSERT TEMP BLADDER CATH: CPT

## 2025-07-15 PROCEDURE — 27100171 HC OXYGEN HIGH FLOW UP TO 24 HOURS

## 2025-07-15 RX ORDER — SODIUM CHLORIDE 9 MG/ML
1000 INJECTION, SOLUTION INTRAVENOUS CONTINUOUS
Status: DISCONTINUED | OUTPATIENT
Start: 2025-07-15 | End: 2025-07-16 | Stop reason: HOSPADM

## 2025-07-15 RX ORDER — ACETAMINOPHEN 325 MG/1
650 TABLET ORAL EVERY 4 HOURS PRN
Status: DISCONTINUED | OUTPATIENT
Start: 2025-07-15 | End: 2025-07-15

## 2025-07-15 RX ORDER — SODIUM CHLORIDE, SODIUM LACTATE, POTASSIUM CHLORIDE, CALCIUM CHLORIDE 600; 310; 30; 20 MG/100ML; MG/100ML; MG/100ML; MG/100ML
INJECTION, SOLUTION INTRAVENOUS ONCE
Status: COMPLETED | OUTPATIENT
Start: 2025-07-15 | End: 2025-07-15

## 2025-07-15 RX ORDER — METOPROLOL TARTRATE 1 MG/ML
2.5 INJECTION, SOLUTION INTRAVENOUS EVERY 4 HOURS PRN
Status: DISCONTINUED | OUTPATIENT
Start: 2025-07-15 | End: 2025-07-16 | Stop reason: HOSPADM

## 2025-07-15 RX ORDER — CALCIUM GLUCONATE 20 MG/ML
1 INJECTION, SOLUTION INTRAVENOUS EVERY 8 HOURS
Status: DISCONTINUED | OUTPATIENT
Start: 2025-07-15 | End: 2025-07-15

## 2025-07-15 RX ORDER — MUPIROCIN 20 MG/G
OINTMENT TOPICAL 2 TIMES DAILY
Status: DISCONTINUED | OUTPATIENT
Start: 2025-07-15 | End: 2025-07-16 | Stop reason: HOSPADM

## 2025-07-15 RX ORDER — ENOXAPARIN SODIUM 100 MG/ML
40 INJECTION SUBCUTANEOUS EVERY 24 HOURS
Status: DISCONTINUED | OUTPATIENT
Start: 2025-07-15 | End: 2025-07-15

## 2025-07-15 RX ORDER — HYDROMORPHONE HYDROCHLORIDE 2 MG/ML
0.5 INJECTION, SOLUTION INTRAMUSCULAR; INTRAVENOUS; SUBCUTANEOUS
Status: DISCONTINUED | OUTPATIENT
Start: 2025-07-15 | End: 2025-07-15

## 2025-07-15 RX ORDER — INSULIN ASPART 100 [IU]/ML
0-5 INJECTION, SOLUTION INTRAVENOUS; SUBCUTANEOUS EVERY 6 HOURS PRN
Status: DISCONTINUED | OUTPATIENT
Start: 2025-07-15 | End: 2025-07-15

## 2025-07-15 RX ORDER — CALCIUM GLUCONATE 20 MG/ML
1 INJECTION, SOLUTION INTRAVENOUS ONCE
Status: COMPLETED | OUTPATIENT
Start: 2025-07-15 | End: 2025-07-15

## 2025-07-15 RX ORDER — DEXTROSE MONOHYDRATE AND SODIUM CHLORIDE 5; .45 G/100ML; G/100ML
INJECTION, SOLUTION INTRAVENOUS CONTINUOUS PRN
Status: DISCONTINUED | OUTPATIENT
Start: 2025-07-15 | End: 2025-07-16 | Stop reason: HOSPADM

## 2025-07-15 RX ORDER — ESCITALOPRAM OXALATE 10 MG/1
10 TABLET ORAL DAILY
Status: DISCONTINUED | OUTPATIENT
Start: 2025-07-15 | End: 2025-07-16 | Stop reason: HOSPADM

## 2025-07-15 RX ORDER — MAGNESIUM SULFATE HEPTAHYDRATE 40 MG/ML
2 INJECTION, SOLUTION INTRAVENOUS ONCE
Status: COMPLETED | OUTPATIENT
Start: 2025-07-15 | End: 2025-07-15

## 2025-07-15 RX ORDER — ONDANSETRON HYDROCHLORIDE 2 MG/ML
4 INJECTION, SOLUTION INTRAVENOUS EVERY 6 HOURS PRN
Status: DISCONTINUED | OUTPATIENT
Start: 2025-07-15 | End: 2025-07-15

## 2025-07-15 RX ORDER — SODIUM CHLORIDE, SODIUM LACTATE, POTASSIUM CHLORIDE, CALCIUM CHLORIDE 600; 310; 30; 20 MG/100ML; MG/100ML; MG/100ML; MG/100ML
INJECTION, SOLUTION INTRAVENOUS CONTINUOUS
Status: DISCONTINUED | OUTPATIENT
Start: 2025-07-15 | End: 2025-07-15

## 2025-07-15 RX ORDER — ENOXAPARIN SODIUM 100 MG/ML
60 INJECTION SUBCUTANEOUS EVERY 12 HOURS
Status: DISCONTINUED | OUTPATIENT
Start: 2025-07-15 | End: 2025-07-15

## 2025-07-15 RX ORDER — TALC
6 POWDER (GRAM) TOPICAL NIGHTLY PRN
Status: DISCONTINUED | OUTPATIENT
Start: 2025-07-15 | End: 2025-07-16 | Stop reason: HOSPADM

## 2025-07-15 RX ORDER — PANTOPRAZOLE SODIUM 40 MG/10ML
40 INJECTION, POWDER, LYOPHILIZED, FOR SOLUTION INTRAVENOUS DAILY
Status: DISCONTINUED | OUTPATIENT
Start: 2025-07-15 | End: 2025-07-16 | Stop reason: HOSPADM

## 2025-07-15 RX ORDER — METOPROLOL TARTRATE 1 MG/ML
5 INJECTION, SOLUTION INTRAVENOUS ONCE
Status: COMPLETED | OUTPATIENT
Start: 2025-07-15 | End: 2025-07-15

## 2025-07-15 RX ORDER — ACETAMINOPHEN 325 MG/1
650 TABLET ORAL EVERY 8 HOURS PRN
Status: DISCONTINUED | OUTPATIENT
Start: 2025-07-15 | End: 2025-07-15

## 2025-07-15 RX ORDER — LEVALBUTEROL INHALATION SOLUTION 1.25 MG/3ML
1.25 SOLUTION RESPIRATORY (INHALATION) EVERY 8 HOURS
Status: DISCONTINUED | OUTPATIENT
Start: 2025-07-15 | End: 2025-07-16 | Stop reason: HOSPADM

## 2025-07-15 RX ORDER — HYDROCODONE BITARTRATE AND ACETAMINOPHEN 5; 325 MG/1; MG/1
1 TABLET ORAL EVERY 4 HOURS PRN
Refills: 0 | Status: DISCONTINUED | OUTPATIENT
Start: 2025-07-15 | End: 2025-07-15

## 2025-07-15 RX ORDER — SODIUM CHLORIDE 0.9 % (FLUSH) 0.9 %
10 SYRINGE (ML) INJECTION EVERY 12 HOURS PRN
Status: DISCONTINUED | OUTPATIENT
Start: 2025-07-15 | End: 2025-07-16 | Stop reason: HOSPADM

## 2025-07-15 RX ORDER — ONDANSETRON HYDROCHLORIDE 2 MG/ML
4 INJECTION, SOLUTION INTRAVENOUS EVERY 6 HOURS PRN
Status: DISCONTINUED | OUTPATIENT
Start: 2025-07-15 | End: 2025-07-16 | Stop reason: HOSPADM

## 2025-07-15 RX ORDER — CALCIUM GLUCONATE 20 MG/ML
1 INJECTION, SOLUTION INTRAVENOUS
Status: COMPLETED | OUTPATIENT
Start: 2025-07-15 | End: 2025-07-15

## 2025-07-15 RX ORDER — CALCIUM GLUCONATE 20 MG/ML
1 INJECTION, SOLUTION INTRAVENOUS
Status: COMPLETED | OUTPATIENT
Start: 2025-07-15 | End: 2025-07-16

## 2025-07-15 RX ORDER — HYDROMORPHONE HYDROCHLORIDE 2 MG/ML
0.25 INJECTION, SOLUTION INTRAMUSCULAR; INTRAVENOUS; SUBCUTANEOUS EVERY 4 HOURS PRN
Refills: 0 | Status: DISCONTINUED | OUTPATIENT
Start: 2025-07-15 | End: 2025-07-15

## 2025-07-15 RX ORDER — GLUCAGON 1 MG
1 KIT INJECTION
Status: DISCONTINUED | OUTPATIENT
Start: 2025-07-15 | End: 2025-07-15

## 2025-07-15 RX ORDER — HYDROMORPHONE HYDROCHLORIDE 2 MG/ML
0.5 INJECTION, SOLUTION INTRAMUSCULAR; INTRAVENOUS; SUBCUTANEOUS
Status: DISCONTINUED | OUTPATIENT
Start: 2025-07-15 | End: 2025-07-16 | Stop reason: HOSPADM

## 2025-07-15 RX ORDER — MORPHINE SULFATE 4 MG/ML
2 INJECTION, SOLUTION INTRAMUSCULAR; INTRAVENOUS EVERY 4 HOURS PRN
Status: DISCONTINUED | OUTPATIENT
Start: 2025-07-15 | End: 2025-07-15

## 2025-07-15 RX ADMIN — CALCIUM GLUCONATE 1 G: 20 INJECTION, SOLUTION INTRAVENOUS at 03:07

## 2025-07-15 RX ADMIN — INSULIN ASPART 4 UNITS: 100 INJECTION, SOLUTION INTRAVENOUS; SUBCUTANEOUS at 10:07

## 2025-07-15 RX ADMIN — CALCIUM GLUCONATE 1 G: 20 INJECTION, SOLUTION INTRAVENOUS at 11:07

## 2025-07-15 RX ADMIN — SODIUM CHLORIDE, POTASSIUM CHLORIDE, SODIUM LACTATE AND CALCIUM CHLORIDE: 600; 310; 30; 20 INJECTION, SOLUTION INTRAVENOUS at 04:07

## 2025-07-15 RX ADMIN — MORPHINE SULFATE 2 MG: 4 INJECTION INTRAVENOUS at 04:07

## 2025-07-15 RX ADMIN — PIPERACILLIN SODIUM AND TAZOBACTAM SODIUM 4.5 G: 4; .5 INJECTION, POWDER, LYOPHILIZED, FOR SOLUTION INTRAVENOUS at 08:07

## 2025-07-15 RX ADMIN — MUPIROCIN 1 G: 20 OINTMENT TOPICAL at 09:07

## 2025-07-15 RX ADMIN — PANTOPRAZOLE SODIUM 40 MG: 40 INJECTION, POWDER, FOR SOLUTION INTRAVENOUS at 03:07

## 2025-07-15 RX ADMIN — HYDROMORPHONE HYDROCHLORIDE 0.5 MG: 2 INJECTION INTRAMUSCULAR; INTRAVENOUS; SUBCUTANEOUS at 12:07

## 2025-07-15 RX ADMIN — AZITHROMYCIN MONOHYDRATE 500 MG: 500 INJECTION, POWDER, LYOPHILIZED, FOR SOLUTION INTRAVENOUS at 04:07

## 2025-07-15 RX ADMIN — CALCIUM GLUCONATE 1 G: 20 INJECTION, SOLUTION INTRAVENOUS at 09:07

## 2025-07-15 RX ADMIN — SODIUM BICARBONATE: 84 INJECTION, SOLUTION INTRAVENOUS at 04:07

## 2025-07-15 RX ADMIN — METOPROLOL TARTRATE 2.5 MG: 1 INJECTION, SOLUTION INTRAVENOUS at 05:07

## 2025-07-15 RX ADMIN — INSULIN ASPART 3 UNITS: 100 INJECTION, SOLUTION INTRAVENOUS; SUBCUTANEOUS at 06:07

## 2025-07-15 RX ADMIN — HYDROMORPHONE HYDROCHLORIDE 0.5 MG: 2 INJECTION INTRAMUSCULAR; INTRAVENOUS; SUBCUTANEOUS at 09:07

## 2025-07-15 RX ADMIN — HYDROMORPHONE HYDROCHLORIDE 0.5 MG: 2 INJECTION INTRAMUSCULAR; INTRAVENOUS; SUBCUTANEOUS at 08:07

## 2025-07-15 RX ADMIN — PIPERACILLIN SODIUM AND TAZOBACTAM SODIUM 4.5 G: 4; .5 INJECTION, POWDER, LYOPHILIZED, FOR SOLUTION INTRAVENOUS at 05:07

## 2025-07-15 RX ADMIN — PIPERACILLIN SODIUM AND TAZOBACTAM SODIUM 4.5 G: 4; .5 INJECTION, POWDER, LYOPHILIZED, FOR SOLUTION INTRAVENOUS at 12:07

## 2025-07-15 RX ADMIN — CALCIUM GLUCONATE 1 G: 20 INJECTION, SOLUTION INTRAVENOUS at 10:07

## 2025-07-15 RX ADMIN — INSULIN ASPART 4 UNITS: 100 INJECTION, SOLUTION INTRAVENOUS; SUBCUTANEOUS at 02:07

## 2025-07-15 RX ADMIN — METOPROLOL TARTRATE 5 MG: 1 INJECTION, SOLUTION INTRAVENOUS at 01:07

## 2025-07-15 RX ADMIN — AZITHROMYCIN MONOHYDRATE 500 MG: 500 INJECTION, POWDER, LYOPHILIZED, FOR SOLUTION INTRAVENOUS at 06:07

## 2025-07-15 RX ADMIN — LEVALBUTEROL HYDROCHLORIDE 1.25 MG: 1.25 SOLUTION RESPIRATORY (INHALATION) at 07:07

## 2025-07-15 RX ADMIN — CALCIUM GLUCONATE 1 G: 20 INJECTION, SOLUTION INTRAVENOUS at 04:07

## 2025-07-15 RX ADMIN — INSULIN HUMAN 0.1 UNITS/KG/HR: 1 INJECTION, SOLUTION INTRAVENOUS at 04:07

## 2025-07-15 RX ADMIN — SODIUM CHLORIDE, POTASSIUM CHLORIDE, SODIUM LACTATE AND CALCIUM CHLORIDE: 600; 310; 30; 20 INJECTION, SOLUTION INTRAVENOUS at 09:07

## 2025-07-15 RX ADMIN — MAGNESIUM SULFATE HEPTAHYDRATE 2 G: 40 INJECTION, SOLUTION INTRAVENOUS at 02:07

## 2025-07-15 RX ADMIN — SODIUM CHLORIDE 1000 ML: 9 INJECTION, SOLUTION INTRAVENOUS at 01:07

## 2025-07-15 RX ADMIN — METOPROLOL TARTRATE 2.5 MG: 1 INJECTION, SOLUTION INTRAVENOUS at 03:07

## 2025-07-15 RX ADMIN — ESCITALOPRAM OXALATE 10 MG: 10 TABLET ORAL at 09:07

## 2025-07-15 RX ADMIN — LEVALBUTEROL HYDROCHLORIDE 1.25 MG: 1.25 SOLUTION RESPIRATORY (INHALATION) at 03:07

## 2025-07-15 RX ADMIN — LEVALBUTEROL HYDROCHLORIDE 1.25 MG: 1.25 SOLUTION RESPIRATORY (INHALATION) at 11:07

## 2025-07-15 RX ADMIN — SODIUM CHLORIDE, POTASSIUM CHLORIDE, SODIUM LACTATE AND CALCIUM CHLORIDE: 600; 310; 30; 20 INJECTION, SOLUTION INTRAVENOUS at 06:07

## 2025-07-15 NOTE — PROGRESS NOTES
[facility] - ICU Hospital Medicine  Progress Note    Patient Name: Jina Levy  MRN: [id number]  Patient Class: IP- Inpatient   Admission Date: 7/14/2025  Length of Stay: 1 days  Attending Physician: JOSUE Garcia,*  Primary Care Provider: Violet, Primary Doctor        Subjective:     Principal Problem:Community acquired pneumonia of right middle lobe of lung    Interval History:   HPI: The patient is a 25-year-old female with a history of morbid obesity, who presents with abdominal pain which started two mornings ago. Pain is located to the epigastric region and radiates to the right upper quadrant. She started with a very highly elevated lipase of 1200 a clinical picture was consistent with acute pancreatitis. Patient also has elevated liver enzymes. She was transferred to our facility for surgical evaluation. CT scan of the abdomen and pelvis does not show a Q cholecystitis, but it does show gallbladder stones and sludge. Patient has evidence of pneumonia of the right middle lobe on CT scan. No pulmonary embolism the same. Currently patient is in the ICU hypotensive tachycardic requiring IV fluid repletion. She's also found to be severely hypocalcemic and have a markedly elevated lipase of 1200. Sofa score is 3 at this time.     7/15-when seen on rounds this morning appeared quite uncomfortable; she remains quite tachycardic with heart rate in the 140s and tachypneic with respiratory rate in 30s; respirations are shallow with splinting due to subdiaphragmatic pain; prior to my visit pain medications had been held due to over sedation related to pain medication received at prior facility    Objective:     Vital Signs (Most Recent):  Temp: 97.3 °F (36.3 °C) (07/15/25 1131)  Pulse: (!) 147 (07/15/25 1131)  Resp: (!) 38 (07/15/25 1230)  BP: 128/79 (07/15/25 1131)  SpO2: 95 % (07/15/25 1131) Vital Signs (24h Range):  Temp:  [97.3 °F (36.3 °C)-98.9 °F (37.2 °C)] 97.3 °F (36.3 °C)  Pulse:   [127-159] 147  Resp:  [18-43] 38  SpO2:  [89 %-99 %] 95 %  BP: ()/(32-86) 128/79     Weight: (!) 171 kg (376 lb 15.8 oz)  Body mass index is 54.09 kg/m².    Intake/Output Summary (Last 24 hours) at 7/15/2025 1443  Last data filed at 7/15/2025 1427  Gross per 24 hour   Intake 697.54 ml   Output 750 ml   Net -52.46 ml      Physical exam  Constitution-morbidly obese, normally developed female in moderate amount of distress  Eyes-PERRL, EOMI  HENT-normocephalic, atraumatic; external ears appear normal; moist mucous membranes  Neck-supple  Respiratory-tachypnea shallow respirations, splinting; CTA with good air movement  Heart-RRR tachycardia; normal S1 and S2; no murmurs, gallops  Abdomen-soft, tenderness primarily across upper abdomen, nondistended  Genitourinary-deferred  Musculoskeletal-no joint abnormalities, normal ROM throughout; no edema  Skin-warm, dry; no rashes  Neurologic-alert and oriented x3; nonfocal exam    Scheduled Meds:   azithromycin  500 mg Intravenous Q24H    calcium gluconate IVPB  1 g Intravenous Q1H    EScitalopram oxalate  10 mg Oral Daily    lactated ringers  500 mL Intravenous Once    levalbuterol  1.25 mg Nebulization Q8H    mupirocin   Nasal BID    pantoprazole  40 mg Intravenous Daily    piperacillin-tazobactam (Zosyn) IV (PEDS and ADULTS) (extended infusion is not appropriate)  4.5 g Intravenous Q8H     Continuous Infusions:   sodium bicarbonate 50 mEq in 0.45% NaCl 1,050 mL infusion   Intravenous Continuous         PRN Meds:.  Current Facility-Administered Medications:     dextrose 50%, 12.5 g, Intravenous, PRN    dextrose 50%, 12.5 g, Intravenous, PRN    dextrose 50%, 25 g, Intravenous, PRN    glucagon (human recombinant), 1 mg, Intramuscular, PRN    HYDROmorphone, 0.5 mg, Intravenous, Q3H PRN    insulin aspart U-100, 0-5 Units, Subcutaneous, Q6H PRN    melatonin, 6 mg, Oral, Nightly PRN    metoprolol, 2.5 mg, Intravenous, Q4H PRN    ondansetron, 4 mg, Intravenous, Q6H PRN     "Flushing PICC/Midline Protocol, , , Until Discontinued **AND** sodium chloride 0.9%, 10 mL, Intravenous, Q12H PRN    Significant Labs: All pertinent labs within the past 24 hours have been reviewed.  A1C:   Recent Labs   Lab 07/15/25  0058   HGBA1C 5.4     ABGs:   Recent Labs   Lab 07/15/25  0407   PH 7.344*   PCO2 30.9*   HCO3 16.9*   POCSATURATED 94   BE -9*   PO2 72*     Bilirubin:   Recent Labs   Lab 07/14/25  0841 07/14/25  2314 07/15/25  0208 07/15/25  0348 07/15/25  1251   BILITOT 0.9 1.6* 1.4 1.5 1.7*     CBC:   Recent Labs   Lab 07/14/25  2314 07/15/25  0208 07/15/25  1251   WBC 12.41* 14.11* 16.20*   HGB 15.3 13.9 12.9   HCT 51.0* 46.6 42.2    275 241     CMP:   Recent Labs   Lab 07/15/25  0208 07/15/25  0348 07/15/25  1251   * 134* 133*   K 4.7 4.3 4.1    103 102   CO2 19* 18* 19*   * 274* 312*   BUN 21.0* 20.0* 20.0*   CREATININE 0.95 0.78 0.80   CALCIUM 5.2* 5.2* 5.3*   PROT 6.1* 6.3* 6.5   ALBUMIN 2.5* 2.6* 2.6*   BILITOT 1.4 1.5 1.7*   ALKPHOS 127 123 100   AST 66* 72* 78*   ALT 81* 80* 68*     Cardiac Markers: No results for input(s): "CKMB", "MYOGLOBIN", "BNP", "TROPISTAT" in the last 48 hours.  Lactic Acid:   Recent Labs   Lab 07/15/25  0208 07/15/25  0348 07/15/25  1251   LACTATE 6.2* 5.1* 3.7*     Lipase:   Recent Labs   Lab 07/13/25  2236 07/14/25  0841 07/15/25  1251   LIPASE 1,229* 1,171* 1,308*     Magnesium:   Recent Labs   Lab 07/14/25  2314 07/15/25  0208 07/15/25  1251   MG 1.39* 1.30* 1.70     POCT Glucose:   Recent Labs   Lab 07/15/25  1007 07/15/25  1144 07/15/25  1406   POCTGLUCOSE 319* 293* 335*     TSH:   Recent Labs   Lab 07/15/25  0058   TSH 0.194*     Urine Culture: No results for input(s): "LABURIN" in the last 48 hours.    Significant Imaging:   CT abdomen/pelvis, 7/14  Impression:  1. The pancreas appears severely edematous with surrounding peripancreatic fat stranding and fluid in the peripancreatic, perisplenic, perihepatic, bilateral pararenal " spaces, bilateral paracolic gutters and pelvic spaces. This is consistent with acute pancreatitis. Correlate with clinical and laboratory findings as regards additional evaluation and follow-up.    CTA chest  Impression:  1. No significant filling is noted to the pulmonary arteries indicate pulmonary embolus  2. Patchy infiltrate atelectasis at the mid and lower lungs bilaterally with small bilateral pleural reaction  3. Ascites  4. Ill-defined lobulated soft tissue enlargement at the pancreas with diffuse surrounding edema/stranding suggesting a advanced pancreatitis    CXR  Impression:  1. Suboptimal inspiration with elevation of the right hemidiaphragm  2. Mild cardiomegaly  3. Patchy hazy opacities right mid lung left lower lung suspicious for infiltrate and atelectasis    BLE venous ultrasound  Impression:  1. No deep venous thrombosis identified to the lower extremities bilaterally.    Assessment/Plan:   Severe, acute pancreatitis (suspected gallstone pancreatitis)  Manage as severe sepsis; she is at high risk for developing MOSF   SOFA score-6 today  Serum lipase remains significantly elevated  Continue aggressive volume resuscitation  Continue symptomatic management  Avoid anticoagulants due to risk of hemorrhagic pancreatitis  Monitor respiratory status closely as patient at high risk for respiratory failure  Provide supplemental O2 to keep O2 saturation greater than 90%  Attempt incentive spirometry as patient can tolerate  Place indwelling urinary catheter to monitor urine output  Repeat CT abdomen/pelvis in a.m. to reassess pancreas    Metabolic acidosis/lactic acidosis   AG initially elevated, now normal  Lactic acid level is trending down thus far  Change IVF to bicarb drip (50 mEq/ L)    Sinus tachycardia  Physiologic response to severe sepsis  Should improve when contributing factors are adequately addressed    Hypocalcemia  Multifactorial  Due to acute pancreatitis as well as low vitamin-D  level  PTH noted to be elevated  Supplement with calcium gluconate  Supplement vitamin-D once patient able to take p.o.    Cholelithiasis  Management as per surgery    Hyperglycemia  Related to acute condition/pancreatitis  Add insulin drip per protocol    Elevated LFTs  Total bilirubin, transaminases mildly elevated; monitor    Microcytosis  Patient likely has iron-deficiency; check iron studies with a.m. lab    GI prophylaxis: Pantoprazole    Code status: Full        Active Diagnoses:    Diagnosis Date Noted POA    PRINCIPAL PROBLEM:  Community acquired pneumonia of right middle lobe of lung [J18.9] 07/15/2025 Unknown    Morbid obesity with BMI >50 [E66.01] 07/15/2025 Unknown    Cholelithiases [K80.20] 07/15/2025 Unknown    Hyperglycemia [R73.9] 07/15/2025 Unknown    Hypocalcemia [E83.51] 07/15/2025 Unknown    Severe protein-calorie malnutrition with hypoalbuminemia [E43] 07/15/2025 Unknown    Elevated liver enzymes [R74.8] 07/15/2025 Unknown      Problems Resolved During this Admission:     VTE Risk Mitigation (From admission, onward)           Ordered     Place sequential compression device  Until discontinued         07/15/25 1443                  Care time:  45 minutes      Darshan Mohamud MD  Department of Hospital Medicine   [facility] - ICU

## 2025-07-15 NOTE — PLAN OF CARE
Problem: Adult Inpatient Plan of Care  Goal: Plan of Care Review  Outcome: Progressing  Goal: Patient-Specific Goal (Individualized)  Outcome: Progressing  Goal: Absence of Hospital-Acquired Illness or Injury  Outcome: Progressing  Goal: Optimal Comfort and Wellbeing  Outcome: Progressing  Goal: Readiness for Transition of Care  Outcome: Progressing     Problem: Bariatric Environmental Safety  Goal: Safety Maintained with Care  Outcome: Progressing     Problem: Pneumonia  Goal: Fluid Balance  Outcome: Progressing  Goal: Resolution of Infection Signs and Symptoms  Outcome: Progressing  Goal: Effective Oxygenation and Ventilation  Outcome: Progressing     Problem: Infection  Goal: Absence of Infection Signs and Symptoms  Outcome: Progressing

## 2025-07-15 NOTE — NURSING
Spoke with Dr. Betancur to notify of patient continuing to be in 10/10 pain. He adjusted Dilaudid to 2mg 24hrs and added Morphine 4mg q 4hrs to give alternating for patient's severe pain.

## 2025-07-15 NOTE — NURSING
Dr. Balderas at bedside, spoke to parents and sister on plan of care, everyone understanding that cholecystectomy on hold until patient more stable. Patient with persistent tachycardia 150s-160s. AAO after receiving narcan and able to sit on bedside commode.  Pending ICU transfer.

## 2025-07-15 NOTE — NURSING
Notified [facility] to verify consult received, spoke with Adriana. Information given to re send for consult.

## 2025-07-15 NOTE — H&P
[facility] - ICU    History & Physical      Patient Name: Jina Levy  MRN: [id number]  Admission Date: 7/14/2025  Attending Physician: JOSUE Garcia,*   Primary Care Provider: Violet, Primary Doctor         Patient information was obtained from patient and ER records.     Subjective:     Principal Problem:Community acquired pneumonia of right middle lobe of lung    Chief Complaint: No chief complaint on file.       HPI: The patient is a 25-year-old female with a history of morbid obesity, who presents with abdominal pain which started two mornings ago. Pain is located to the epigastric region and radiates to the right upper quadrant. She started with a very highly elevated lipase of 1200 a clinical picture was consistent with acute pancreatitis. Patient also has elevated liver enzymes. She was transferred to our facility for surgical evaluation. CT scan of the abdomen and pelvis does not show a Q cholecystitis, but it does show gallbladder stones and sludge. Patient has evidence of pneumonia of the right middle lobe on CT scan. No pulmonary embolism the same. Currently patient is in the ICU hypotensive tachycardic requiring IV fluid repletion. She's also found to be severely hypocalcemic and have a markedly elevated lipase of 1200. Sofa score is 3 at this time.     Past Medical History:   Diagnosis Date    Anemia, unspecified     GERD (gastroesophageal reflux disease)        Past Surgical History:   Procedure Laterality Date    WISDOM TOOTH EXTRACTION         Review of patient's allergies indicates:  No Known Allergies    Current Facility-Administered Medications on File Prior to Encounter   Medication    [DISCONTINUED] 0.45% NaCl infusion    [DISCONTINUED] acetaminophen tablet 650 mg    [DISCONTINUED] acetaminophen tablet 650 mg    [DISCONTINUED] cefTRIAXone injection 1 g    [DISCONTINUED] famotidine tablet 20 mg    [DISCONTINUED] HYDROmorphone (PF) injection 0.5 mg    [DISCONTINUED]  HYDROmorphone (PF) injection 1 mg    [DISCONTINUED] HYDROmorphone (PF) injection 2 mg    [DISCONTINUED] HYDROmorphone (PF) injection 2 mg    [DISCONTINUED] labetalol 20 mg/4 mL (5 mg/mL) IV syring    [DISCONTINUED] lactated ringers infusion    [DISCONTINUED] loperamide capsule 2 mg    [DISCONTINUED] melatonin tablet 6 mg    [DISCONTINUED] metoprolol injection 5 mg    [DISCONTINUED] morphine injection 4 mg    [DISCONTINUED] morphine injection 4 mg    [DISCONTINUED] ondansetron injection 4 mg    [DISCONTINUED] pantoprazole injection 40 mg    [DISCONTINUED] piperacillin-tazobactam (ZOSYN) 4.5 g in D5W 100 mL IVPB (MB+)    [DISCONTINUED] polyethylene glycol packet 17 g    [DISCONTINUED] prochlorperazine injection Soln 5 mg    [DISCONTINUED] senna-docusate 8.6-50 mg per tablet 1 tablet    [DISCONTINUED] sodium chloride 0.45 % bolus 1,000 mL    [DISCONTINUED] sodium chloride 0.9% flush 2 mL     Current Outpatient Medications on File Prior to Encounter   Medication Sig    EScitalopram oxalate (LEXAPRO) 10 MG tablet Take 1 tablet (10 mg total) by mouth once daily.     Family History    None       Tobacco Use    Smoking status: Never    Smokeless tobacco: Never   Substance and Sexual Activity    Alcohol use: Not Currently    Drug use: Not Currently     Types: Marijuana    Sexual activity: Yes     Partners: Male     Review of Systems 10pt ROS performed and is negative unless otherwise noted above.     Social Drivers of Health     Tobacco Use: Low Risk  (6/16/2025)    Patient History     Smoking Tobacco Use: Never     Smokeless Tobacco Use: Never     Passive Exposure: Not on file   Alcohol Use: Not At Risk (10/7/2020)    Received from [facility] and [facility] Subsidiaries and Affiliates    AUDIT-C     Frequency of Alcohol Consumption: Never     Average Number of Drinks: Not asked     Frequency of Binge Drinking: Never   Financial Resource Strain: Low Risk  (7/15/2025)    Overall Financial  Resource Strain (CARDIA)     Difficulty of Paying Living Expenses: Not hard at all   Food Insecurity: No Food Insecurity (7/15/2025)    Hunger Vital Sign     Worried About Running Out of Food in the Last Year: Never true     Ran Out of Food in the Last Year: Never true   Transportation Needs: No Transportation Needs (7/15/2025)    PRAPARE - Transportation     Lack of Transportation (Medical): No     Lack of Transportation (Non-Medical): No   Physical Activity: Insufficiently Active (7/16/2020)    Received from [facility] and Its Subsidiaries and Affiliates    Exercise Vital Sign     Days of Exercise per Week: 4 days     Minutes of Exercise per Session: 30 min   Stress: Stress Concern Present (7/15/2025)    [employer] - Occupational Stress Questionnaire     Feeling of Stress : To some extent   Housing Stability: Low Risk  (7/15/2025)    Housing Stability Vital Sign     Unable to Pay for Housing in the Last Year: No     Number of Times Moved in the Last Year: 0     Homeless in the Last Year: No   Depression: Not on file   Utilities: Not At Risk (7/15/2025)    [facility] Utilities     Threatened with loss of utilities: No   Health Literacy: Adequate Health Literacy (7/15/2025)     Health Literacy     Frequency of need for help with medical instructions: Rarely   Social Isolation: Not on file       Objective:     Vital Signs (Most Recent):  Temp: 97.3 °F (36.3 °C) (07/15/25 0155)  Pulse: (!) 127 (07/15/25 0155)  Resp: (!) 41 (07/15/25 0155)  BP: 93/62 (07/15/25 0155)  SpO2: (!) 94 % (07/15/25 0155) Vital Signs (24h Range):  Temp:  [97.3 °F (36.3 °C)-98.9 °F (37.2 °C)] 97.3 °F (36.3 °C)  Pulse:  [107-159] 127  Resp:  [18-43] 41  SpO2:  [89 %-99 %] 94 %  BP: ()/() 93/62     Weight: (!) 171 kg (376 lb 15.8 oz)  Body mass index is 54.09 kg/m².    Physical Exam   Done via limitations of telemedicine and equipment:  General Appearance: Normal  appearance. In no acute distress.  Respiratory:  Clear bilaterally, No Respiratory Distress, No Accessory Muscle Use  Cardiovascular: Regular Rate, Regular Rhythm, No murmurs noted  Abdomen/GI: Diffusely TTP, Normal Bowel Sounds   Extremities:  No cyanosis, clubbing, no edema    Psych/Mental Status: Thought process normal. Mood congruent.  Neurologic:  Alert, No Focal Weakness.       Significant Labs: All pertinent labs within the past 24 hours have been reviewed.  Recent Lab Results  (Last 5 results in the past 24 hours)        07/15/25  0208   07/15/25  0110   07/15/25  0058   07/14/25  2314   07/14/25  1852        Influenza A, Molecular   Not Detected             Influenza B, Molecular   Not Detected             Albumin/Globulin Ratio 0.7       0.8         Albumin 2.5       2.9                162         ALT 81       100         Anion Gap 13.0       17.0         AST 66       70         Baso #       0.00         Basophil %       0.0         BILIRUBIN TOTAL 1.4       1.6         BUN 21.0       21         BUN/CREAT RATIO 22       21         Calcium 5.2  Comment: Critical Result called and verified by verbal readback to: Leandra Delarosa on 07/15/2025 at 02:34. Reported by [id number].       5.7         Chloride 103       101         CO2 19       16         Creatinine 0.95       1.00         eGFR >60  Comment: Estimated GFR calculated using the CKD-EPI creatinine (2021) equation.       >60  Comment: Estimated GFR calculated using the CKD-EPI creatinine (2021) equation.         Eos #       0.00         Eos %       0.0         Estimated Avg Glucose     108.3           Globulin, Total 3.6       3.7         Glucose 265       244         Group & Rh       B POS         Hematocrit       51.0         Hemoglobin       15.3         Hemoglobin A1C External     5.4           Immature Grans (Abs)       0.05         Immature Granulocytes       0.4         Indirect Katrina GEL       NEG         INR       1.1         Lactic Acid  Level 6.2  Comment: Critical Result called and verified by verbal readback to: Leandra Delarosa on 07/15/2025 at 02:31. Reported by [id number].       6.4  Comment: Critical Result called and verified by verbal readback to: Michelle Hairston on 07/14/2025 at 23:37. Reported by [id number].    A repeat order for Lactic Acid has been placed for collection in 3 hours.   4.7  Comment: Suspect COVID 19  Critical Result called and verified by verbal readback to: Daisy Ash on 07/14/2025 at 19:26. Reported by [id number].       Lymph #       1.30         LYMPH %       10.5         Magnesium  1.30       1.39         MCH       20.6         MCHC       30.0         MCV       68.8         Mono #       1.08         Mono %       8.7         MPV       10.2         Neut #       9.98         Neut %       80.4         Phosphorus Level 2.5       3.0         Platelet Count       281         Potassium 4.7       5.3         PROTEIN TOTAL 6.1       6.6         PT       14.4         RBC       7.41         RDW       20.9         SARS-CoV2 (COVID-19) Qualitative PCR   Not Detected             Sodium 135       134         Specimen Outdate       07/17/2025 23:59         Troponin I     0.161           TSH     0.194           WBC       12.41                                Significant Imaging: I have reviewed all pertinent imaging results/findings within the past 24 hours.  I have reviewed and interpreted all pertinent imaging results/findings within the past 24 hours.    Assessment/Plan:     Active Diagnoses:    Diagnosis Date Noted POA    PRINCIPAL PROBLEM:  Community acquired pneumonia of right middle lobe of lung [J18.9] 07/15/2025 Unknown    Morbid obesity with BMI >50 [E66.01] 07/15/2025 Unknown     * Cholelithiases [K80.20]  Acute pancreatitis 07/15/2025 Unknown    Hyperglycemia [R73.9] 07/15/2025 Unknown    Hypocalcemia [E83.51] 07/15/2025 Unknown    Severe protein-calorie malnutrition with hypoalbuminemia [E43] 07/15/2025 Unknown    Elevated  liver enzymes [R74.8] 07/15/2025 Unknown     - IV Zosyn and Zithromax will cover both intraabdominal processes and lung processes.  - Scheduled Xopenex  - Follow blood cultures  - SSI while in house. A1c is 5.4%  - Calcium gluconate 1g IV TID  - Check TSH and FT4  - Check iPTH, Vitamin D3-OH  - Nutritional counseling  - Change sodium bicarbonate to LR  - Pain, nausea control, IVF ongoing  - Surgical consultation    Critical care time 90 mins    This encounter was completed via telemedicine (audio/video) w/ nursing at bedside ot assist w/ clinical exam.  SOC Audio/Visual Equipment is using HIPPA Compliant Web Platform. The patient is located in the hospital and the provider is located off site. This patient is placed in inpatient status under my care.       Participants on Call: Bedside RN, Patient, Physician on Call.   Problems Resolved During this Admission:     VTE Risk Mitigation (From admission, onward)      None              Ryland Shin MD  Department of Hospital Medicine   [facility] - ICU

## 2025-07-15 NOTE — DISCHARGE SUMMARY
Hospital Medicine  Discharge Summary    Patient Name: Jina Levy  MRN: [id number]  Admit Date: 7/13/2025  Discharge Date: 7/14/2025   Status: OP- Observation   Length of Stay: 0      PHYSICIANS   Admitting Physician: Can Betancur MD  Discharging Physician: Can Betnacur MD.  Primary Care Physician: No, Primary Doctor        DISCHARGE DIAGNOSES   Acute Pancreatitis   Obesity BMI 54      PROCEDURES   * No surgery found *         HOSPITAL COURSE    25 y.o. female with a history that includes obesity, presented to ED with abdominal pain since yesterday morning at 4 AM. Reports pain worsen ing, now constant located in epigastric region and radiate to RUQ. Lipase > 1200 and CT A/P consistent with acute pancreatitis.     Concern for extensive cholelithiasis on US abdomen. Transfer for [facility] for surgical eval.    STATUS  Improved, Stable    DISPOSITION  Discharge to transfer to [facility]     DIET  npo    ACTIVITY  As tolerated      FOLLOW-UP      Follow-up Information       No, Primary Doctor Follow up.                               DISCHARGE MEDICATION RECONCILIATION        Medication List        ASK your doctor about these medications      EScitalopram oxalate 10 MG tablet  Commonly known as: LEXAPRO  Take 1 tablet (10 mg total) by mouth once daily.                PHYSICAL EXAM   VITALS: T 98.9 °F (37.2 °C)   /76   P (!) 150   RR (!) 24   O2 96 %    GENERAL: Awake and in NAD  HEENT: NC/AT, EOMI, PERRL.  NECK: Supple,  No JVD  LUNGS: CTA B/L  CVS: RRR, S1S2 positive  GI/: Soft, NT/ND, bowel sounds positive.  EXTREMITIES: Peripheral pulses 2+, no peripheral edema  DERM: Warm, dry.  No rashes or lesions noted.  NEURO: AAOx3, no focal neurologic deficit  PSYCH: Cooperative, appropriate mood and affect         DIAGNOSITCS   CBC:   Recent Labs   Lab 07/14/25  0841 07/14/25  2314 07/15/25  0208   WBC 9.25 12.41* 14.11*   HGB 16.4* 15.3 13.9   HCT 53.7* 51.0* 46.6    281 275        CMP:   Recent Labs   Lab 07/14/25  2314 07/15/25  0208 07/15/25  0348   * 265* 274*   CALCIUM 5.7* 5.2* 5.2*   ALBUMIN 2.9* 2.5* 2.6*   PROT 6.6 6.1* 6.3*   * 135* 134*   K 5.3* 4.7 4.3   CO2 16* 19* 18*    103 103   BUN 21* 21.0* 20.0*   CREATININE 1.00 0.95 0.78   ALKPHOS 162* 127 123   * 81* 80*   AST 70* 66* 72*   BILITOT 1.6* 1.4 1.5   MG 1.39* 1.30*  --    PHOS 3.0 2.5  --      Estimated Creatinine Clearance: 191.6 mL/min (based on SCr of 0.78 mg/dL).    Labs within the past 24 hours have been reviewed.     COAG:  Recent Labs   Lab 07/14/25  2314   INR 1.1       CARDIAC ENZYMES:   Recent Labs     07/15/25  0058   TROPONINI 0.161*        Recent Labs   Lab 07/13/25  2236 07/14/25  0841   LIPASE 1,229* 1,171*       Recent Labs     07/15/25  0321 07/15/25  0614 07/15/25  1007   POCTGLUCOSE 243* 277* 319*        Microbiology Results (last 7 days)       ** No results found for the last 168 hours. **             Recent Labs     07/14/25  0204   TRIG 110      Lab Results   Component Value Date    HGBA1C 5.4 07/15/2025        X-Ray Chest 1 View  Result Date: 7/15/2025  EXAMINATION: XR CHEST 1 VIEW CLINICAL HISTORY: Picc Line;, . COMPARISON: 02/05/2024 FINDINGS: An AP view or more reveals the heart to be mildly enlarged.  Inspiration is less than optimal.  There is elevation of the right hemidiaphragm.  There is patchy hazy opacities at the right mid lung and left lower lung.     1. Suboptimal inspiration with elevation of the right hemidiaphragm 2. Mild cardiomegaly 3. Patchy hazy opacities right mid lung left lower lung suspicious for infiltrate and atelectasis Electronically signed by: Joesph Prater Date:    07/15/2025 Time:    09:03    CTA Chest Non-Coronary (PE Studies)  Result Date: 7/15/2025  EXAMINATION: CT ANGIOGRAM CHEST WITH IV CONTRAST: CLINICAL HISTORY: High clinical suspicion for pulmonary embolus TECHNIQUE: PATIENT RADIATION DOSE: DLP(mGycm) 482 As per PQRS measures, all CT  scans at this facility used dose modulation, iterative reconstruction, and/or weight based dose adjustment when appropriate to reduce radiation dose to as low as reasonably achievable. COMPARISON: None available FINDINGS: Serial axial images were obtained of the chest with the administration of IV contrast. Additional coronal and sagittal mip reconstructions as well as 3-D rotational spin reconstructions were performed. NASCET criteria was utilized. Bony structures appear grossly intact.  Thyroid lobes are poorly visualized.  No mediastinal or axillary lymphadenopathy identified.  The heart is normal in size.  Contrast is identified within the heart pulmonary vessels.  No significant filling defects noted to the central pulmonary arteries and central pulmonary artery divisions to indicate pulmonary embolus.  The major airways are grossly patent.  Patchy opacities evident at the mid and lower lungs bilaterally.  Small pleural reaction evident the bilaterally.  There is free fluid in the upper abdomen.  There is mucosal prominence the versus underdistention at the stomach.  There is ill-defined soft tissue enlargement of the apparent pancreas diffusely throughout..     1. No significant filling is noted to the pulmonary arteries indicate pulmonary embolus 2. Patchy infiltrate atelectasis at the mid and lower lungs bilaterally with small bilateral pleural reaction 3. Ascites 4. Ill-defined lobulated soft tissue enlargement at the pancreas with diffuse surrounding edema/stranding suggesting a advanced pancreatitis 5. Findings and other details as above Electronically signed by: Joesph Prater Date:    07/15/2025 Time:    09:01    US Abdomen Limited_Gallbladder  Result Date: 7/14/2025  EXAMINATION: US ABDOMEN LIMITED_GALLBLADDER CLINICAL HISTORY: ruq pain; TECHNIQUE: Multiple sagittal and transverse images were obtained of the abdomen to include the right upper quadrant. COMPARISON: CT scan dated 07/14/2025 FINDINGS: The  pancreas is enlarged and edematous consistent with patient's history of pancreatitis.. .  The proximal aorta measures 2.1 cm.  The mid and distal aorta are not well seen.  IVC is patent. The liver is enlarged in size.  It measures 19.8 cm.  It is echogenic consistent with hepatic steatosis.  No liver mass or lesion is seen.  Portal and hepatic veins appear normal.  There is a small amount of perihepatic free fluid seen.  Some perinephric fluid is seen as well along the right side. There are multiple gallstones in the gallbladder.  It is contracted..  No pericholecystic fluid is seen.  Gallbladder wall measures 4.5 mm.  Common bile duct measures 1.2 mm.  There is some sludge versus stone in the common bile duct at the level the pancreas.  This is seen on image 58. The right kidney measures 12.2 cm.  . No hydronephrosis is seen.  No hydroureter is seen.  No cortical mass or lesion is seen.  No nephrolithiasis is seen.  Flow to the kidney appears normal.     Hepatomegaly and hepatic steatosis Enlarged edematous pancreas consistent with patient's known history of pancreatitis There is some free fluid around the liver and the right kidney which is likely reactive from the inflammatory changes at the pancreatitis Extensive cholelithiasis.  There is some sludge versus stone in the common bile duct which is nonobstructive. Electronically signed by: Robert Moon Date:    07/14/2025 Time:    13:48    CT Abdomen Pelvis With IV Contrast NO Oral Contrast  Result Date: 7/14/2025  Technique: CT of the abdomen and pelvis was performed with axial images as well as sagittal and coronal reconstruction images with intravenous contrast. Comparison: Comparison is with study dated January 11, 2021 Clinical History: Abd pain with nausea and vomiting. Dosage Information: Automated Exposure Control was utilized 1319.35 mGy.cm. Findings: Lines and Tubes: None. Thorax: Lungs: The visualized lung bases appear unremarkable. Pleura: No effusions  or thickening are seen. No pneumothorax is seen in the visualized lung bases. Abdomen: Abdominal Wall: No abdominal wall pathology is seen. Liver: Liver is remarkable for steatosis. Biliary System: No intrahepatic or extrahepatic biliary duct dilatation is seen. Gallbladder: The gallbladder appears unremarkable with no stones wall thickening or pericholecystic inflammatory changes or fluid. Pancreas: The pancreas appears severely edematous with surrounding peripancreatic fat stranding and fluid in the peripancreatic, perisplenic, perihepatic, bilateral pararenal spaces, bilateral paracolic gutters and pelvic spaces. This is consistent with acute pancreatitis. No discrete organized fluid collection is identified. Spleen: The spleen appears unremarkable. Adrenals: The adrenal glands appear unremarkable. Kidneys: The kidneys appear unremarkable with no stones cysts masses or hydronephrosis with IV contrast decreasing sensitivity and specificity for stones. Aorta: The visualized abdominal aorta appears unremarkable. Bowel: Esophagus: The visualized distal esophagus appears unremarkable. Stomach: The stomach appears unremarkable. Duodenum: Unremarkable appearing duodenum. Small Bowel: The small bowel appears unremarkable. Colon: Nondistended. Appendix: The appendix is not identified but no inflammatory changes are seen in the right lower quadrant to suggest appendicitis. Peritoneum: No free intraperitoneal air is seen. Pelvis: Bladder: The bladder appears unremarkable. Female: Uterus: The uterus appears unremarkable for age. Ovaries: No adnexal masses are seen. Bony structures: Dorsal Spine: The visualized dorsal spine appears unremarkable. Bony Pelvis: The visualized bony structures of the pelvis appear unremarkable.     Impression: 1. The pancreas appears severely edematous with surrounding peripancreatic fat stranding and fluid in the peripancreatic, perisplenic, perihepatic, bilateral pararenal spaces, bilateral  paracolic gutters and pelvic spaces. This is consistent with acute pancreatitis. Correlate with clinical and laboratory findings as regards additional evaluation and follow-up. 2. Details and other findings as discussed above. No significant discrepancy with overnight report. Electronically signed by: García Canada Date:    07/14/2025 Time:    06:57      N/A     Patient Screened for food insecurity, housing instability, transportation needs, utility difficulties, and interpersonal safety.  No needs identified    Discharge time: 33 minutes         Can Betancur MD  [facility]

## 2025-07-15 NOTE — NURSING
Dr. Snyder notified of patient admission @ 3042 with admit vitals 91/77, , RR 42. Notified physician of recent rapid response called at previous facility. Patient currently lethargic and difficult to arouse. Parents and sister at bedside and stated lethargy started yesterday. Order received to give 1L NS bolus, start rocephin, give narcan, consult hospital medicine, and move patient to ICU.     Dr. Snyder called back @ 5448 stating to complete STAT CTA chest to rule out PE.

## 2025-07-15 NOTE — NURSING
Patient noted to be weak with heart rate between 150s -200s. BP noted to be 100/76. This nurse called Dr. Betancur . Left message. Rapid response called. Dr. Betancur called during this time and ordered 1 liter bolus. Dr. Rod ordered an EKG which showed sinus tach. At this time Dr. Rod and Pipe discussed patient. No further orders given. Dr. Rod advised to hold Zosyn as this was her first dose and to continue with the bolus of fluids. Awaiting arrival of [facility] ambulance for transfer. Dr. Betancur advised to hold any further pain medication or medication for heart rate.

## 2025-07-15 NOTE — CONSULTS
[facility] - ICU  Cardiology  Consult Note    Patient Name: Jina Levy  MRN: [id number]  Admission Date: 7/14/2025  Hospital Length of Stay: 1 days  Code Status: Full Code   Attending Provider: JOSUE Garcia,*   Consulting Provider: DC Funez  Primary Care Physician: Violet, Primary Doctor  Principal Problem:Community acquired pneumonia of right middle lobe of lung    Patient information was obtained from patient and primary team.     Inpatient consult to Cardiology  Consult performed by: Clyde Forman FNP  Consult ordered by: JOSUE Garcia MD  Reason for consult: Tachycardia        Subjective:     Chief Complaint:  Abdominal pain     HPI: Patient is a 26 yo female unknown to [facility]. She presented to ED with abdominal pain over the past two days. She was found to have an elevated lipase level and clinical picture of acute pancreatitis. She was transferred here for surgical evaluation. She also had evidence of pneumonia. Further testing was negative for PE and DVT. She was admitted to ICU for hypotension which improved with IV hydration. [facility] was consulted for tachycardia. She denies any CP. Her only complaint is abdominal pain.     Past Medical History:   Diagnosis Date    Anemia, unspecified     GERD (gastroesophageal reflux disease)        Past Surgical History:   Procedure Laterality Date    WISDOM TOOTH EXTRACTION         Review of patient's allergies indicates:  No Known Allergies    Current Facility-Administered Medications on File Prior to Encounter   Medication    [DISCONTINUED] 0.45% NaCl infusion    [DISCONTINUED] acetaminophen tablet 650 mg    [DISCONTINUED] acetaminophen tablet 650 mg    [DISCONTINUED] famotidine tablet 20 mg    [DISCONTINUED] HYDROmorphone (PF) injection 2 mg    [DISCONTINUED] labetalol 20 mg/4 mL (5 mg/mL) IV syring    [DISCONTINUED] loperamide capsule 2 mg    [DISCONTINUED] melatonin tablet 6 mg    [DISCONTINUED] metoprolol injection 5  mg    [DISCONTINUED] morphine injection 4 mg    [DISCONTINUED] ondansetron injection 4 mg    [DISCONTINUED] pantoprazole injection 40 mg    [DISCONTINUED] piperacillin-tazobactam (ZOSYN) 4.5 g in D5W 100 mL IVPB (MB+)    [DISCONTINUED] polyethylene glycol packet 17 g    [DISCONTINUED] prochlorperazine injection Soln 5 mg    [DISCONTINUED] senna-docusate 8.6-50 mg per tablet 1 tablet    [DISCONTINUED] sodium chloride 0.45 % bolus 1,000 mL    [DISCONTINUED] sodium chloride 0.9% flush 2 mL     Current Outpatient Medications on File Prior to Encounter   Medication Sig    EScitalopram oxalate (LEXAPRO) 10 MG tablet Take 1 tablet (10 mg total) by mouth once daily.     Family History    None       Tobacco Use    Smoking status: Never    Smokeless tobacco: Never   Substance and Sexual Activity    Alcohol use: Not Currently    Drug use: Not Currently     Types: Marijuana    Sexual activity: Yes     Partners: Male     Review of Systems   Constitutional: Positive for malaise/fatigue.   Cardiovascular:  Positive for palpitations.   Respiratory: Negative.     Gastrointestinal:  Positive for abdominal pain.     Objective:     Vital Signs (Most Recent):  Temp: 97.4 °F (36.3 °C) (07/15/25 1613)  Pulse: (!) 147 (07/15/25 1543)  Resp: (!) 28 (07/15/25 1527)  BP: 108/76 (07/15/25 1543)  SpO2: (!) 92 % (07/15/25 1527) Vital Signs (24h Range):  Temp:  [97.3 °F (36.3 °C)-98.9 °F (37.2 °C)] 97.4 °F (36.3 °C)  Pulse:  [127-159] 147  Resp:  [18-43] 28  SpO2:  [89 %-99 %] 92 %  BP: ()/(32-79) 108/76     Weight: (!) 171 kg (376 lb 15.8 oz)  Body mass index is 54.09 kg/m².    SpO2: (!) 92 %         Intake/Output Summary (Last 24 hours) at 7/15/2025 1657  Last data filed at 7/15/2025 1427  Gross per 24 hour   Intake 697.54 ml   Output 750 ml   Net -52.46 ml       Lines/Drains/Airways       Peripherally Inserted Central Catheter Line  Duration             PICC Triple Lumen 07/15/25 0517 right brachial <1 day              Drain  Duration                   Urethral Catheter 07/15/25 1319 <1 day              Peripheral Intravenous Line  Duration             Peripheral IV Single Lumen 07/13/25 2240 20 G Left Antecubital 1 day                    Physical Exam  Constitutional:       General: She is not in acute distress.  Eyes:      Extraocular Movements: Extraocular movements intact.   Cardiovascular:      Rate and Rhythm: Regular rhythm. Tachycardia present.      Heart sounds: No murmur heard.  Pulmonary:      Effort: No respiratory distress.   Musculoskeletal:         General: No swelling.   Skin:     General: Skin is warm and dry.   Neurological:      Mental Status: She is alert and oriented to person, place, and time.   Psychiatric:         Mood and Affect: Mood normal.         Behavior: Behavior normal.         Significant Labs: CMP   Recent Labs   Lab 07/15/25  0208 07/15/25  0348 07/15/25  1251   * 134* 133*   K 4.7 4.3 4.1    103 102   CO2 19* 18* 19*   * 274* 312*   BUN 21.0* 20.0* 20.0*   CREATININE 0.95 0.78 0.80   CALCIUM 5.2* 5.2* 5.3*   PROT 6.1* 6.3* 6.5   ALBUMIN 2.5* 2.6* 2.6*   BILITOT 1.4 1.5 1.7*   ALKPHOS 127 123 100   AST 66* 72* 78*   ALT 81* 80* 68*    and CBC   Recent Labs   Lab 07/14/25  2314 07/15/25  0208 07/15/25  1251   WBC 12.41* 14.11* 16.20*   HGB 15.3 13.9 12.9   HCT 51.0* 46.6 42.2    275 241       Significant Imaging:   Assessment and Plan:     Sinus tachycardia  Physiologic response to Pneumonia and pancreatitis  This should improve with treatment of underlying issues  Acute pancreatitis  Tx per primary and surgery  Hypocalcemia  Cholelithiasis  Per surgery  MO      Thank you for your consult.   Please call with any further questions.     Clyde Forman, FNP  Cardiology   [facility] - ICU

## 2025-07-15 NOTE — PROCEDURES
"Jina Levy is a 25 y.o. female patient.    Temp: 97.3 °F (36.3 °C) (07/15/25 0307)  Pulse: (!) 143 (07/15/25 0307)  Resp: (!) 35 (07/15/25 0436)  BP: 103/69 (07/15/25 0307)  SpO2: 96 % (07/15/25 0307)  Weight: (!) 171 kg (376 lb 15.8 oz) (07/15/25 0155)  Height: 5' 10" (177.8 cm) (07/15/25 0155)    PICC  Time out: Immediately prior to procedure a time out was called to verify the correct patient, procedure, equipment, support staff and site/side marked as required  Indications: med administration  Preparation: skin prepped with ChloraPrep  Skin prep agent dried: skin prep agent completely dried prior to procedure  Sterile barriers: all five maximum sterile barriers used - cap, mask, sterile gown, sterile gloves, and large sterile sheet  Hand hygiene: hand hygiene performed prior to central venous catheter insertion  Location details: right brachial  Catheter type: triple lumen  Catheter size: 5 Fr  Catheter Length: 45cm    Ultrasound guidance: yes  Needle advanced into vessel with real time Ultrasound guidance.  Guidewire confirmed in vessel.  Sterile sheath used.  Number of attempts: 1  Post-procedure: blood return through all ports and sterile dressing applied    Assessment: placement verified by x-ray          Name cheli  7/15/2025    "

## 2025-07-16 ENCOUNTER — ANESTHESIA EVENT (OUTPATIENT)
Dept: INTENSIVE CARE | Facility: HOSPITAL | Age: 25
DRG: 438 | End: 2025-07-16
Payer: MEDICAID

## 2025-07-16 ENCOUNTER — ANESTHESIA (OUTPATIENT)
Dept: INTENSIVE CARE | Facility: HOSPITAL | Age: 25
DRG: 438 | End: 2025-07-16
Payer: MEDICAID

## 2025-07-16 VITALS
RESPIRATION RATE: 35 BRPM | SYSTOLIC BLOOD PRESSURE: 122 MMHG | TEMPERATURE: 98 F | BODY MASS INDEX: 41.95 KG/M2 | HEART RATE: 138 BPM | DIASTOLIC BLOOD PRESSURE: 81 MMHG | WEIGHT: 293 LBS | OXYGEN SATURATION: 96 % | HEIGHT: 70 IN

## 2025-07-16 PROBLEM — K85.90 SEVERE ACUTE PANCREATITIS: Status: ACTIVE | Noted: 2025-07-16

## 2025-07-16 PROBLEM — K85.10 ACUTE BILIARY PANCREATITIS: Status: ACTIVE | Noted: 2025-07-16

## 2025-07-16 LAB
APTT PPP: 25.8 SECONDS (ref 23.2–33.7)
INR PPP: 1.2
POCT GLUCOSE: 140 MG/DL (ref 70–110)
POCT GLUCOSE: 162 MG/DL (ref 70–110)
PROTHROMBIN TIME: 14.9 SECONDS (ref 12.5–14.5)

## 2025-07-16 PROCEDURE — 25000003 PHARM REV CODE 250: Performed by: NURSE ANESTHETIST, CERTIFIED REGISTERED

## 2025-07-16 PROCEDURE — 94002 VENT MGMT INPAT INIT DAY: CPT

## 2025-07-16 PROCEDURE — 25000003 PHARM REV CODE 250: Performed by: EMERGENCY MEDICINE

## 2025-07-16 PROCEDURE — 27100171 HC OXYGEN HIGH FLOW UP TO 24 HOURS

## 2025-07-16 PROCEDURE — 63600175 PHARM REV CODE 636 W HCPCS: Performed by: SURGERY

## 2025-07-16 PROCEDURE — 27200966 HC CLOSED SUCTION SYSTEM

## 2025-07-16 PROCEDURE — 0BH17EZ INSERTION OF ENDOTRACHEAL AIRWAY INTO TRACHEA, VIA NATURAL OR ARTIFICIAL OPENING: ICD-10-PCS | Performed by: SURGERY

## 2025-07-16 PROCEDURE — 5A1935Z RESPIRATORY VENTILATION, LESS THAN 24 CONSECUTIVE HOURS: ICD-10-PCS | Performed by: SURGERY

## 2025-07-16 PROCEDURE — 63600175 PHARM REV CODE 636 W HCPCS: Performed by: NURSE ANESTHETIST, CERTIFIED REGISTERED

## 2025-07-16 PROCEDURE — 31500 INSERT EMERGENCY AIRWAY: CPT

## 2025-07-16 RX ORDER — DEXTROSE MONOHYDRATE AND SODIUM CHLORIDE 5; .45 G/100ML; G/100ML
INJECTION, SOLUTION INTRAVENOUS CONTINUOUS PRN
OUTPATIENT
Start: 2025-07-16

## 2025-07-16 RX ORDER — HYDROMORPHONE HYDROCHLORIDE 2 MG/ML
0.5 INJECTION, SOLUTION INTRAMUSCULAR; INTRAVENOUS; SUBCUTANEOUS
Refills: 0 | OUTPATIENT
Start: 2025-07-16

## 2025-07-16 RX ORDER — SODIUM CHLORIDE 9 MG/ML
1000 INJECTION, SOLUTION INTRAVENOUS CONTINUOUS
OUTPATIENT
Start: 2025-07-16

## 2025-07-16 RX ORDER — ONDANSETRON HYDROCHLORIDE 2 MG/ML
4 INJECTION, SOLUTION INTRAVENOUS EVERY 6 HOURS PRN
Status: ACTIVE | OUTPATIENT
Start: 2025-07-16

## 2025-07-16 RX ORDER — PROPOFOL 10 MG/ML
0-50 INJECTION, EMULSION INTRAVENOUS CONTINUOUS
OUTPATIENT
Start: 2025-07-16

## 2025-07-16 RX ORDER — ESCITALOPRAM OXALATE 10 MG/1
10 TABLET ORAL DAILY
OUTPATIENT
Start: 2025-07-16

## 2025-07-16 RX ORDER — ETOMIDATE 2 MG/ML
INJECTION INTRAVENOUS
Status: DISCONTINUED | OUTPATIENT
Start: 2025-07-16 | End: 2025-07-16 | Stop reason: HOSPADM

## 2025-07-16 RX ORDER — SODIUM CHLORIDE 0.9 % (FLUSH) 0.9 %
10 SYRINGE (ML) INJECTION EVERY 12 HOURS PRN
OUTPATIENT
Start: 2025-07-16

## 2025-07-16 RX ORDER — ENOXAPARIN SODIUM 100 MG/ML
60 INJECTION SUBCUTANEOUS EVERY 12 HOURS
OUTPATIENT
Start: 2025-07-16

## 2025-07-16 RX ORDER — ENOXAPARIN SODIUM 100 MG/ML
40 INJECTION SUBCUTANEOUS EVERY 12 HOURS
Status: DISCONTINUED | OUTPATIENT
Start: 2025-07-16 | End: 2025-07-16

## 2025-07-16 RX ORDER — MUPIROCIN 20 MG/G
OINTMENT TOPICAL 2 TIMES DAILY
OUTPATIENT
Start: 2025-07-16 | End: 2025-07-20

## 2025-07-16 RX ORDER — PROPOFOL 10 MG/ML
0-50 INJECTION, EMULSION INTRAVENOUS CONTINUOUS
Status: DISCONTINUED | OUTPATIENT
Start: 2025-07-16 | End: 2025-07-16 | Stop reason: HOSPADM

## 2025-07-16 RX ORDER — METOPROLOL TARTRATE 1 MG/ML
2.5 INJECTION, SOLUTION INTRAVENOUS EVERY 4 HOURS PRN
OUTPATIENT
Start: 2025-07-16

## 2025-07-16 RX ORDER — TALC
6 POWDER (GRAM) TOPICAL NIGHTLY PRN
OUTPATIENT
Start: 2025-07-16

## 2025-07-16 RX ORDER — ONDANSETRON HYDROCHLORIDE 2 MG/ML
4 INJECTION, SOLUTION INTRAVENOUS EVERY 6 HOURS PRN
OUTPATIENT
Start: 2025-07-16

## 2025-07-16 RX ORDER — PANTOPRAZOLE SODIUM 40 MG/10ML
40 INJECTION, POWDER, LYOPHILIZED, FOR SOLUTION INTRAVENOUS DAILY
OUTPATIENT
Start: 2025-07-16

## 2025-07-16 RX ORDER — SODIUM CHLORIDE 0.9 % (FLUSH) 0.9 %
10 SYRINGE (ML) INJECTION
Status: ACTIVE | OUTPATIENT
Start: 2025-07-16

## 2025-07-16 RX ORDER — ENOXAPARIN SODIUM 100 MG/ML
60 INJECTION SUBCUTANEOUS EVERY 12 HOURS
Status: DISCONTINUED | OUTPATIENT
Start: 2025-07-16 | End: 2025-07-16 | Stop reason: HOSPADM

## 2025-07-16 RX ORDER — PROPOFOL 10 MG/ML
VIAL (ML) INTRAVENOUS
Status: DISCONTINUED | OUTPATIENT
Start: 2025-07-16 | End: 2025-07-16 | Stop reason: HOSPADM

## 2025-07-16 RX ORDER — PROCHLORPERAZINE EDISYLATE 5 MG/ML
5 INJECTION INTRAMUSCULAR; INTRAVENOUS EVERY 6 HOURS PRN
Status: ACTIVE | OUTPATIENT
Start: 2025-07-16

## 2025-07-16 RX ORDER — SUCCINYLCHOLINE CHLORIDE 20 MG/ML
INJECTION INTRAMUSCULAR; INTRAVENOUS
Status: DISCONTINUED | OUTPATIENT
Start: 2025-07-16 | End: 2025-07-16 | Stop reason: HOSPADM

## 2025-07-16 RX ORDER — LEVALBUTEROL INHALATION SOLUTION 1.25 MG/3ML
1.25 SOLUTION RESPIRATORY (INHALATION) EVERY 8 HOURS
OUTPATIENT
Start: 2025-07-16

## 2025-07-16 RX ADMIN — ETOMIDATE 14 MG: 2 INJECTION INTRAVENOUS at 01:07

## 2025-07-16 RX ADMIN — PROPOFOL 50 MCG/KG/MIN: 10 INJECTION, EMULSION INTRAVENOUS at 02:07

## 2025-07-16 RX ADMIN — PROPOFOL 80 MG: 10 INJECTION, EMULSION INTRAVENOUS at 01:07

## 2025-07-16 RX ADMIN — INSULIN HUMAN 0.02 UNITS/KG/HR: 1 INJECTION, SOLUTION INTRAVENOUS at 01:07

## 2025-07-16 RX ADMIN — SODIUM BICARBONATE: 84 INJECTION, SOLUTION INTRAVENOUS at 01:07

## 2025-07-16 RX ADMIN — SUCCINYLCHOLINE CHLORIDE 120 MG: 20 INJECTION, SOLUTION INTRAMUSCULAR; INTRAVENOUS at 01:07

## 2025-07-16 RX ADMIN — PROPOFOL 50 MCG/KG/MIN: 10 INJECTION, EMULSION INTRAVENOUS at 01:07

## 2025-07-16 NOTE — ANESTHESIA PROCEDURE NOTES
Intubation    Date/Time: 7/16/2025 1:26 AM    Performed by: Jayce Odom CRNA  Authorized by: Jayce Odom CRNA    Intubation:     Induction:  Rapid sequence induction    Mask Ventilation:  Easy mask    Attempts:  1    Attempted By:  CRNA    Method of Intubation:  Direct    Blade:  Simmons 2    Laryngeal View Grade: Grade I - full view of cords      Difficult Airway Encountered?: No      Complications:  None    Airway Device:  Oral endotracheal tube    Airway Device Size:  7.5    Style/Cuff Inflation:  Cuffed (inflated to minimal occlusive pressure)    Tube secured:  22    Secured at:  The lips    Placement Verified By:  Colorimetric ETCO2 device and Chest x-ray    Complicating Factors:  None    Findings Post-Intubation:  BS equal bilateral and atraumatic/condition of teeth unchanged

## 2025-07-16 NOTE — H&P
[facility] - ICU  General Surgery  History & Physical    Patient Name: Jina Levy  MRN: [id number]  Admission Date: 7/14/2025  Attending Physician: JOSUE Garcia,*   Primary Care Provider: Violet, Primary Doctor    Patient information was obtained from patient and ER records.     Subjective:     Chief Complaint/Reason for Admission: abdominal pain    History of Present Illness:  25F with acute onset of abdominal pain on the day of admission (7/14/25) at [facility]; h/o recent postpartum state (delivered baby girl two months prior), super morbid obesity with BMI 54.09.  Patient was admitted to [facility] for pancreatitis.  Workup was concerning for cholelithiasis and gallstone pancreatitis - she was transferred to [facility] for surgical evaluation.  Just before transfer, patient had rapid response called due to heart rate to 150s and decreased mental status.  Physician and nursing staff/medical were not notified about this change in medical state prior to transfer - patient was hypotensive, tachypneic, and tachycardic on admission.  Due to recent postpartum state especially, CTA was ordered to r/o PE.  PE was not identified, but patient had right middle lobe pneumonia.  Fluid resuscitation was provided, and IV abx were initiated.  She was seen by Dr. Andrey Mohan early this morning/overnight - recommendations for hydration, iv abx, and supportive care.  During the day, it seems she has been initiated on BIPAP, and insulin drip has been initiated for hyperglycemia.  Hypocalcemia is being repleted.  She currently has difficulty speaking, but she is able to communicate with hand motions and some words.  She has been moving around in the bed uncomfortable.     Workup is suggestive of sludge in the CBD.  She does not have overt evidence of cholangitis or definite biliary obstruction to push for early ERCP, although ERCP should be considered.      No current  facility-administered medications on file prior to encounter.     Current Outpatient Medications on File Prior to Encounter   Medication Sig    EScitalopram oxalate (LEXAPRO) 10 MG tablet Take 1 tablet (10 mg total) by mouth once daily.       Review of patient's allergies indicates:  No Known Allergies    Past Medical History:   Diagnosis Date    Anemia, unspecified     GERD (gastroesophageal reflux disease)      Past Surgical History:   Procedure Laterality Date    WISDOM TOOTH EXTRACTION       Family History    None       Tobacco Use    Smoking status: Never    Smokeless tobacco: Never   Substance and Sexual Activity    Alcohol use: Not Currently    Drug use: Not Currently     Types: Marijuana    Sexual activity: Yes     Partners: Male     Review of Systems   Constitutional:  Positive for activity change and fatigue. Negative for chills and fever.   HENT: Negative.     Eyes: Negative.    Respiratory:          On BIPAP currently   Cardiovascular:         Tachycardia   Gastrointestinal:  Positive for abdominal pain, diarrhea and nausea.   Endocrine:        Acute hyperparathyroidism in the setting of hypocalcemia associated with pancreatitis   Genitourinary: Negative.    Musculoskeletal: Negative.    Allergic/Immunologic: Negative.    Neurological: Negative.    Hematological: Negative.    Psychiatric/Behavioral: Negative.       Objective:     Vital Signs (Most Recent):  Temp: 97.7 °F (36.5 °C) (07/15/25 2302)  Pulse: (!) 138 (07/15/25 2304)  Resp: (!) 40 (07/15/25 2304)  BP: 128/79 (07/15/25 2302)  SpO2: (!) 94 % (07/15/25 2304) Vital Signs (24h Range):  Temp:  [97.3 °F (36.3 °C)-97.7 °F (36.5 °C)] 97.7 °F (36.5 °C)  Pulse:  [] 138  Resp:  [7-58] 40  SpO2:  [85 %-99 %] 94 %  BP: ()/(32-91) 128/79     Weight: (!) 171 kg (376 lb 15.8 oz)  Body mass index is 54.09 kg/m².    Physical Exam  Constitutional:       Comments: On bipap having difficulty communicating   HENT:      Head: Normocephalic and atraumatic.       Right Ear: External ear normal.      Left Ear: External ear normal.      Nose: Nose normal.      Mouth/Throat:      Mouth: Mucous membranes are dry.      Pharynx: No oropharyngeal exudate.   Eyes:      Extraocular Movements: Extraocular movements intact.   Cardiovascular:      Rate and Rhythm: Tachycardia present.      Pulses: Normal pulses.   Pulmonary:      Effort: Pulmonary effort is normal.   Abdominal:      Palpations: Abdomen is soft.      Tenderness: There is abdominal tenderness.      Comments: Tenderness present to upper abdomen   Musculoskeletal:         General: Normal range of motion.      Cervical back: Normal range of motion and neck supple.   Skin:     General: Skin is warm and dry.   Neurological:      General: No focal deficit present.      Mental Status: She is alert and oriented to person, place, and time.   Psychiatric:         Mood and Affect: Mood normal.         Behavior: Behavior normal.         Significant Labs:  I have reviewed all pertinent lab results within the past 24 hours.  CBC:   Recent Labs   Lab 07/15/25  1251   WBC 16.20*   RBC 6.07*   HGB 12.9   HCT 42.2      MCV 69.5*   MCH 21.3*   MCHC 30.6*     CMP:   Recent Labs   Lab 07/15/25  1251 07/15/25  1855   * 144*   CALCIUM 5.3* 5.0*   ALBUMIN 2.6*  --    PROT 6.5  --    * 134*   K 4.1 3.6   CO2 19* 18*    104   BUN 20.0* 17.0   CREATININE 0.80 0.77   ALKPHOS 100  --    ALT 68*  --    AST 78*  --    BILITOT 1.7*  --      Coagulation:   Recent Labs   Lab 07/14/25  2314   INR 1.1       Significant Diagnostics:  I have reviewed all pertinent imaging results/findings within the past 24 hours.  CT: I have reviewed all pertinent results/findings within the past 24 hours and my personal findings are:  diffuse pancreatitis with acute fluid surrounding the pancreas  Narrative & Impression  Technique: CT of the abdomen and pelvis was performed with axial images as well as sagittal and coronal reconstruction  images with intravenous contrast.  Comparison: Comparison is with study dated January 11, 2021  Clinical History: Abd pain with nausea and vomiting.  Dosage Information: Automated Exposure Control was utilized 1319.35 mGy.cm.  Findings:  Lines and Tubes: None.  Thorax:  Lungs: The visualized lung bases appear unremarkable.  Pleura: No effusions or thickening are seen. No pneumothorax is seen in the visualized lung bases.  Abdomen:  Abdominal Wall: No abdominal wall pathology is seen.  Liver: Liver is remarkable for steatosis.  Biliary System: No intrahepatic or extrahepatic biliary duct dilatation is seen.  Gallbladder: The gallbladder appears unremarkable with no stones wall thickening or pericholecystic inflammatory changes or fluid.  Pancreas: The pancreas appears severely edematous with surrounding peripancreatic fat stranding and fluid in the peripancreatic, perisplenic, perihepatic, bilateral pararenal spaces, bilateral paracolic gutters and pelvic spaces. This is consistent with acute pancreatitis. No discrete organized fluid collection is identified.  Spleen: The spleen appears unremarkable.  Adrenals: The adrenal glands appear unremarkable.  Kidneys: The kidneys appear unremarkable with no stones cysts masses or hydronephrosis with IV contrast decreasing sensitivity and specificity for stones.  Aorta: The visualized abdominal aorta appears unremarkable.  Bowel:  Esophagus: The visualized distal esophagus appears unremarkable.  Stomach: The stomach appears unremarkable.  Duodenum: Unremarkable appearing duodenum.  Small Bowel: The small bowel appears unremarkable.  Colon: Nondistended.  Appendix: The appendix is not identified but no inflammatory changes are seen in the right lower quadrant to suggest appendicitis.  Peritoneum: No free intraperitoneal air is seen.  Pelvis:  Bladder: The bladder appears unremarkable.  Female:  Uterus: The uterus appears unremarkable for age.  Ovaries: No adnexal masses are  seen.  Bony structures:  Dorsal Spine: The visualized dorsal spine appears unremarkable.  Bony Pelvis: The visualized bony structures of the pelvis appear unremarkable.  Impression:  Impression:  1. The pancreas appears severely edematous with surrounding peripancreatic fat stranding and fluid in the peripancreatic, perisplenic, perihepatic, bilateral pararenal spaces, bilateral paracolic gutters and pelvic spaces. This is consistent with acute pancreatitis. Correlate with clinical and laboratory findings as regards additional evaluation and follow-up.  2. Details and other findings as discussed above.  No significant discrepancy with overnight report.        Electronically signed by:García Canada  Date:                                            07/14/2025  Time:                                           06:57  Assessment/Plan:     Active Diagnoses:    Diagnosis Date Noted POA    PRINCIPAL PROBLEM:  Community acquired pneumonia of right middle lobe of lung [J18.9] 07/15/2025 Unknown    Morbid obesity with BMI >50 [E66.01] 07/15/2025 Unknown    Cholelithiases [K80.20] 07/15/2025 Unknown    Hyperglycemia [R73.9] 07/15/2025 Unknown    Hypocalcemia [E83.51] 07/15/2025 Unknown    Severe protein-calorie malnutrition with hypoalbuminemia [E43] 07/15/2025 Unknown    Elevated liver enzymes [R74.8] 07/15/2025 Unknown      Problems Resolved During this Admission:     VTE Risk Mitigation (From admission, onward)           Ordered     IP VTE HIGH RISK PATIENT  Once         07/15/25 1535     Place sequential compression device  Until discontinued         07/15/25 1443                  25F transferred to [facility] from [facility] for surgical evaluation due to gallstone pancreatitis.  Patient had clinically declined prior to transfer - her respiratory status has worsened, and she is requiring bipap.  She has sludge and stones in the CBD, although cholangitis or definite biliary obstruction are not certainly present.   Hypocalcemia is being treated.  Elevation in iPTH seems reactive at this time.  Hyperglycemia is being treated with insulin drip.      Recent postpartum state noted (had baby 2 months prior).     Pancreatitis is severe clinically and based on ct scan.  She needs higher level of care for overall state, including for Pulmonology evaluation and GI evaluation (consideration of ERCP).      Plan for:  1. Neuro - pain control  2. CV - supportive care with hydration - not requiring pressors at this time  3. Resp - Cont bipap.  There is concern she may need intubation  4. FEN/GI - NPO for now.  Consider enteral nutrition once respiratory state improves or is controlled (ie if intubated).   Will need GI consultation for consideration of ERCP in time given concerns for choledocholithiasis and pancreatitis. Cont insulin drip.   5.  - cont latham catheter.  Urine output 1000 mls for day shift.  6. Heme/ID - Cont iv abx for suspected pneumonia  7. Ppx - Lovenox for DVT ppx.  Protonix iv for now.    OVERALL - transfer to [facility] if beds available for Pulmonology care given declining pulmonary state and currently critical illness as well as for GI evaluation.     I appreciate the consultation and the opportunity to be involved in Mrs. Levy's care.     Fiordaliza Mohan MD  General Surgery/Surgical Oncology  [facility] - ICU

## 2025-07-16 NOTE — NURSING
Report given to Cesar COLE at [facility] ICU.  Will notify him once patient is in route with  [facility]

## 2025-07-17 LAB
ALBUMIN SERPL-MCNC: 2 G/DL (ref 3.5–5)
ALBUMIN/GLOB SERPL: 0.4 RATIO (ref 1.1–2)
ALP SERPL-CCNC: 84 UNIT/L (ref 40–150)
ALT SERPL-CCNC: 40 UNIT/L (ref 0–55)
ANION GAP SERPL CALC-SCNC: 13 MEQ/L
AST SERPL-CCNC: 69 UNIT/L (ref 11–45)
BILIRUB SERPL-MCNC: 1.2 MG/DL
BUN SERPL-MCNC: 10.3 MG/DL (ref 7–18.7)
CALCIUM SERPL-MCNC: 4.7 MG/DL (ref 8.4–10.2)
CHLORIDE SERPL-SCNC: 96 MMOL/L (ref 98–107)
CO2 SERPL-SCNC: 23 MMOL/L (ref 22–29)
CREAT SERPL-MCNC: 0.71 MG/DL (ref 0.55–1.02)
CREAT/UREA NIT SERPL: 15
GFR SERPLBLD CREATININE-BSD FMLA CKD-EPI: >60 ML/MIN/1.73/M2
GLOBULIN SER-MCNC: 4.5 GM/DL (ref 2.4–3.5)
GLUCOSE SERPL-MCNC: 279 MG/DL (ref 74–100)
MAGNESIUM SERPL-MCNC: 1.9 MG/DL (ref 1.6–2.6)
PHOSPHATE SERPL-MCNC: 1.2 MG/DL (ref 2.3–4.7)
POTASSIUM SERPL-SCNC: 3.6 MMOL/L (ref 3.5–5.1)
PROT SERPL-MCNC: 6.5 GM/DL (ref 6.4–8.3)
SODIUM SERPL-SCNC: 132 MMOL/L (ref 136–145)

## 2025-07-18 LAB
ALBUMIN SERPL-MCNC: 1.7 G/DL (ref 3.5–5)
ALBUMIN/GLOB SERPL: 0.4 RATIO (ref 1.1–2)
ALP SERPL-CCNC: 80 UNIT/L (ref 40–150)
ALT SERPL-CCNC: 27 UNIT/L (ref 0–55)
ANION GAP SERPL CALC-SCNC: 12 MEQ/L
AST SERPL-CCNC: 59 UNIT/L (ref 11–45)
BILIRUB SERPL-MCNC: 0.7 MG/DL
BUN SERPL-MCNC: 6.6 MG/DL (ref 7–18.7)
CALCIUM SERPL-MCNC: 4.8 MG/DL (ref 8.4–10.2)
CHLORIDE SERPL-SCNC: 100 MMOL/L (ref 98–107)
CO2 SERPL-SCNC: 24 MMOL/L (ref 22–29)
CREAT SERPL-MCNC: 0.63 MG/DL (ref 0.55–1.02)
CREAT/UREA NIT SERPL: 10
GFR SERPLBLD CREATININE-BSD FMLA CKD-EPI: >60 ML/MIN/1.73/M2
GLOBULIN SER-MCNC: 4.7 GM/DL (ref 2.4–3.5)
GLUCOSE SERPL-MCNC: 258 MG/DL (ref 74–100)
MAGNESIUM SERPL-MCNC: 2.1 MG/DL (ref 1.6–2.6)
PHOSPHATE SERPL-MCNC: 0.9 MG/DL (ref 2.3–4.7)
POTASSIUM SERPL-SCNC: 3.2 MMOL/L (ref 3.5–5.1)
PROT SERPL-MCNC: 6.4 GM/DL (ref 6.4–8.3)
SODIUM SERPL-SCNC: 136 MMOL/L (ref 136–145)

## 2025-07-19 LAB
ALBUMIN SERPL-MCNC: 1.6 G/DL (ref 3.5–5)
ALBUMIN/GLOB SERPL: 0.3 RATIO (ref 1.1–2)
ALP SERPL-CCNC: 83 UNIT/L (ref 40–150)
ALT SERPL-CCNC: 22 UNIT/L (ref 0–55)
ANION GAP SERPL CALC-SCNC: 12 MEQ/L
AST SERPL-CCNC: 41 UNIT/L (ref 11–45)
BILIRUB SERPL-MCNC: 0.5 MG/DL
BUN SERPL-MCNC: 6.9 MG/DL (ref 7–18.7)
CALCIUM SERPL-MCNC: 5.2 MG/DL (ref 8.4–10.2)
CHLORIDE SERPL-SCNC: 100 MMOL/L (ref 98–107)
CO2 SERPL-SCNC: 26 MMOL/L (ref 22–29)
CREAT SERPL-MCNC: 0.63 MG/DL (ref 0.55–1.02)
CREAT/UREA NIT SERPL: 11
GFR SERPLBLD CREATININE-BSD FMLA CKD-EPI: >60 ML/MIN/1.73/M2
GLOBULIN SER-MCNC: 5 GM/DL (ref 2.4–3.5)
GLUCOSE SERPL-MCNC: 236 MG/DL (ref 74–100)
MAGNESIUM SERPL-MCNC: 2.2 MG/DL (ref 1.6–2.6)
PHOSPHATE SERPL-MCNC: 1.8 MG/DL (ref 2.3–4.7)
POTASSIUM SERPL-SCNC: 3.7 MMOL/L (ref 3.5–5.1)
PROT SERPL-MCNC: 6.6 GM/DL (ref 6.4–8.3)
SODIUM SERPL-SCNC: 138 MMOL/L (ref 136–145)

## 2025-07-20 LAB
BACTERIA BLD CULT: NORMAL
BACTERIA BLD CULT: NORMAL

## 2025-08-14 ENCOUNTER — ANESTHESIA (OUTPATIENT)
Dept: SURGERY | Facility: HOSPITAL | Age: 25
End: 2025-08-14
Payer: MEDICAID

## 2025-08-14 ENCOUNTER — ANESTHESIA EVENT (OUTPATIENT)
Dept: SURGERY | Facility: HOSPITAL | Age: 25
End: 2025-08-14
Payer: MEDICAID

## 2025-08-14 PROCEDURE — 25000003 PHARM REV CODE 250: Performed by: STUDENT IN AN ORGANIZED HEALTH CARE EDUCATION/TRAINING PROGRAM

## 2025-08-14 PROCEDURE — 63600175 PHARM REV CODE 636 W HCPCS: Performed by: STUDENT IN AN ORGANIZED HEALTH CARE EDUCATION/TRAINING PROGRAM

## 2025-08-14 RX ORDER — PHENYLEPHRINE HCL IN 0.9% NACL 1 MG/10 ML
SYRINGE (ML) INTRAVENOUS
Status: DISCONTINUED | OUTPATIENT
Start: 2025-08-14 | End: 2025-08-14

## 2025-08-14 RX ORDER — FENTANYL CITRATE 50 UG/ML
INJECTION, SOLUTION INTRAMUSCULAR; INTRAVENOUS
Status: DISCONTINUED | OUTPATIENT
Start: 2025-08-14 | End: 2025-08-14

## 2025-08-14 RX ORDER — MIDAZOLAM HYDROCHLORIDE 1 MG/ML
INJECTION INTRAMUSCULAR; INTRAVENOUS
Status: DISCONTINUED | OUTPATIENT
Start: 2025-08-14 | End: 2025-08-14

## 2025-08-14 RX ORDER — CEFAZOLIN SODIUM 1 G/3ML
INJECTION, POWDER, FOR SOLUTION INTRAMUSCULAR; INTRAVENOUS
Status: DISCONTINUED | OUTPATIENT
Start: 2025-08-14 | End: 2025-08-14

## 2025-08-14 RX ORDER — ROCURONIUM BROMIDE 10 MG/ML
INJECTION, SOLUTION INTRAVENOUS
Status: DISCONTINUED | OUTPATIENT
Start: 2025-08-14 | End: 2025-08-14

## 2025-08-14 RX ADMIN — ROCURONIUM BROMIDE 50 MG: 10 SOLUTION INTRAVENOUS at 06:08

## 2025-08-14 RX ADMIN — Medication 200 MCG: at 07:08

## 2025-08-14 RX ADMIN — CEFAZOLIN 3 G: 330 INJECTION, POWDER, FOR SOLUTION INTRAMUSCULAR; INTRAVENOUS at 06:08

## 2025-08-14 RX ADMIN — SUGAMMADEX 400 MG: 100 INJECTION, SOLUTION INTRAVENOUS at 07:08

## 2025-08-14 RX ADMIN — Medication 200 MCG: at 06:08

## 2025-08-14 RX ADMIN — MIDAZOLAM HYDROCHLORIDE 2 MG: 1 INJECTION, SOLUTION INTRAMUSCULAR; INTRAVENOUS at 06:08

## 2025-08-14 RX ADMIN — SODIUM CHLORIDE, SODIUM GLUCONATE, SODIUM ACETATE, POTASSIUM CHLORIDE AND MAGNESIUM CHLORIDE: 526; 502; 368; 37; 30 INJECTION, SOLUTION INTRAVENOUS at 06:08

## 2025-08-14 RX ADMIN — FENTANYL CITRATE 100 MCG: 50 INJECTION, SOLUTION INTRAMUSCULAR; INTRAVENOUS at 07:08

## 2025-08-15 ENCOUNTER — ANESTHESIA EVENT (OUTPATIENT)
Dept: ENDOSCOPY | Facility: HOSPITAL | Age: 25
End: 2025-08-15
Payer: MEDICAID

## 2025-08-15 ENCOUNTER — ANESTHESIA (OUTPATIENT)
Dept: ENDOSCOPY | Facility: HOSPITAL | Age: 25
End: 2025-08-15
Payer: MEDICAID

## 2025-08-15 VITALS
RESPIRATION RATE: 20 BRPM | OXYGEN SATURATION: 100 % | HEART RATE: 84 BPM | DIASTOLIC BLOOD PRESSURE: 58 MMHG | SYSTOLIC BLOOD PRESSURE: 102 MMHG

## 2025-08-15 PROCEDURE — 63600175 PHARM REV CODE 636 W HCPCS: Performed by: NURSE ANESTHETIST, CERTIFIED REGISTERED

## 2025-08-15 PROCEDURE — 25000003 PHARM REV CODE 250: Performed by: NURSE ANESTHETIST, CERTIFIED REGISTERED

## 2025-08-15 RX ORDER — PROPOFOL 10 MG/ML
VIAL (ML) INTRAVENOUS
Status: DISCONTINUED | OUTPATIENT
Start: 2025-08-15 | End: 2025-08-15

## 2025-08-15 RX ORDER — LIDOCAINE HYDROCHLORIDE 20 MG/ML
INJECTION INTRAVENOUS
Status: DISCONTINUED | OUTPATIENT
Start: 2025-08-15 | End: 2025-08-15

## 2025-08-15 RX ADMIN — PROPOFOL 40 MG: 10 INJECTION, EMULSION INTRAVENOUS at 11:08

## 2025-08-15 RX ADMIN — PROPOFOL 30 MG: 10 INJECTION, EMULSION INTRAVENOUS at 11:08

## 2025-08-15 RX ADMIN — PROPOFOL 60 MG: 10 INJECTION, EMULSION INTRAVENOUS at 12:08

## 2025-08-15 RX ADMIN — PROPOFOL 40 MG: 10 INJECTION, EMULSION INTRAVENOUS at 12:08

## 2025-08-15 RX ADMIN — LIDOCAINE HYDROCHLORIDE 80 MG: 20 INJECTION INTRAVENOUS at 11:08

## 2025-08-15 RX ADMIN — PROPOFOL 100 MG: 10 INJECTION, EMULSION INTRAVENOUS at 11:08

## 2025-08-15 RX ADMIN — SODIUM CHLORIDE: 9 INJECTION, SOLUTION INTRAVENOUS at 11:08
